# Patient Record
Sex: MALE | Race: WHITE | NOT HISPANIC OR LATINO | Employment: OTHER | URBAN - NONMETROPOLITAN AREA
[De-identification: names, ages, dates, MRNs, and addresses within clinical notes are randomized per-mention and may not be internally consistent; named-entity substitution may affect disease eponyms.]

---

## 2017-05-08 ENCOUNTER — OFFICE VISIT (OUTPATIENT)
Dept: OTOLARYNGOLOGY | Facility: CLINIC | Age: 77
End: 2017-05-08

## 2017-05-08 VITALS
WEIGHT: 198 LBS | DIASTOLIC BLOOD PRESSURE: 90 MMHG | SYSTOLIC BLOOD PRESSURE: 160 MMHG | HEIGHT: 72 IN | RESPIRATION RATE: 18 BRPM | TEMPERATURE: 97.8 F | BODY MASS INDEX: 26.82 KG/M2 | HEART RATE: 79 BPM

## 2017-05-08 DIAGNOSIS — H61.23 BILATERAL IMPACTED CERUMEN: Primary | ICD-10-CM

## 2017-05-08 PROCEDURE — 69210 REMOVE IMPACTED EAR WAX UNI: CPT | Performed by: PHYSICIAN ASSISTANT

## 2017-05-08 RX ORDER — BENAZEPRIL HYDROCHLORIDE 10 MG/1
10 TABLET ORAL DAILY
COMMUNITY
End: 2018-07-27 | Stop reason: HOSPADM

## 2017-11-13 ENCOUNTER — APPOINTMENT (OUTPATIENT)
Dept: ULTRASOUND IMAGING | Age: 77
DRG: 280 | End: 2017-11-13
Payer: MEDICARE

## 2017-11-13 ENCOUNTER — APPOINTMENT (OUTPATIENT)
Dept: GENERAL RADIOLOGY | Age: 77
DRG: 280 | End: 2017-11-13
Payer: MEDICARE

## 2017-11-13 ENCOUNTER — APPOINTMENT (OUTPATIENT)
Dept: CT IMAGING | Age: 77
DRG: 280 | End: 2017-11-13
Payer: MEDICARE

## 2017-11-13 ENCOUNTER — HOSPITAL ENCOUNTER (INPATIENT)
Age: 77
LOS: 1 days | Discharge: LEFT BEFORE DISCHARGE | DRG: 280 | End: 2017-11-14
Attending: EMERGENCY MEDICINE | Admitting: HOSPITALIST
Payer: MEDICARE

## 2017-11-13 DIAGNOSIS — J90 BILATERAL PLEURAL EFFUSION: ICD-10-CM

## 2017-11-13 DIAGNOSIS — R09.02 HYPOXIA: Primary | ICD-10-CM

## 2017-11-13 LAB
ALBUMIN SERPL-MCNC: 4 G/DL (ref 3.5–5.2)
ALP BLD-CCNC: 62 U/L (ref 40–130)
ALT SERPL-CCNC: 11 U/L (ref 5–41)
ANION GAP SERPL CALCULATED.3IONS-SCNC: 18 MMOL/L (ref 7–19)
AST SERPL-CCNC: 15 U/L (ref 5–40)
BASE EXCESS ARTERIAL: -0.9 MMOL/L (ref -2–2)
BASOPHILS ABSOLUTE: 0.1 K/UL (ref 0–0.2)
BASOPHILS RELATIVE PERCENT: 0.6 % (ref 0–1)
BILIRUB SERPL-MCNC: 0.4 MG/DL (ref 0.2–1.2)
BUN BLDV-MCNC: 13 MG/DL (ref 8–23)
C-REACTIVE PROTEIN: 0.73 MG/DL (ref 0–0.5)
CALCIUM SERPL-MCNC: 8.9 MG/DL (ref 8.8–10.2)
CARBOXYHEMOGLOBIN ARTERIAL: 1.9 % (ref 0–5)
CHLORIDE BLD-SCNC: 99 MMOL/L (ref 98–111)
CO2: 22 MMOL/L (ref 22–29)
CREAT SERPL-MCNC: 0.9 MG/DL (ref 0.5–1.2)
EOSINOPHILS ABSOLUTE: 0 K/UL (ref 0–0.6)
EOSINOPHILS RELATIVE PERCENT: 0.1 % (ref 0–5)
GFR NON-AFRICAN AMERICAN: >60
GLUCOSE BLD-MCNC: 178 MG/DL (ref 70–99)
GLUCOSE BLD-MCNC: 200 MG/DL (ref 70–99)
GLUCOSE BLD-MCNC: 259 MG/DL (ref 70–99)
GLUCOSE BLD-MCNC: 289 MG/DL (ref 74–109)
HCO3 ARTERIAL: 24.3 MMOL/L (ref 22–26)
HCT VFR BLD CALC: 39.9 % (ref 42–52)
HCT VFR BLD CALC: 42.6 % (ref 42–52)
HEMOGLOBIN, ART, EXTENDED: 15 G/DL (ref 14–18)
HEMOGLOBIN: 13.6 G/DL (ref 14–18)
HEMOGLOBIN: 14.5 G/DL (ref 14–18)
INR BLD: 0.98 (ref 0.88–1.18)
LV EF: 35 %
LVEF MODALITY: NORMAL
LYMPHOCYTES ABSOLUTE: 1.1 K/UL (ref 1.1–4.5)
LYMPHOCYTES RELATIVE PERCENT: 10.4 % (ref 20–40)
MAGNESIUM: 1.6 MG/DL (ref 1.6–2.4)
MCH RBC QN AUTO: 31.8 PG (ref 27–31)
MCH RBC QN AUTO: 31.8 PG (ref 27–31)
MCHC RBC AUTO-ENTMCNC: 34 G/DL (ref 33–37)
MCHC RBC AUTO-ENTMCNC: 34.1 G/DL (ref 33–37)
MCV RBC AUTO: 93.2 FL (ref 80–94)
MCV RBC AUTO: 93.4 FL (ref 80–94)
METHEMOGLOBIN ARTERIAL: 1 %
MONOCYTES ABSOLUTE: 0.5 K/UL (ref 0–0.9)
MONOCYTES RELATIVE PERCENT: 4.5 % (ref 0–10)
NEUTROPHILS ABSOLUTE: 9.1 K/UL (ref 1.5–7.5)
NEUTROPHILS RELATIVE PERCENT: 84.1 % (ref 50–65)
O2 CONTENT ARTERIAL: 17.8 ML/DL
O2 SAT, ARTERIAL: 84.8 %
O2 THERAPY: ABNORMAL
PCO2 ARTERIAL: 41 MMHG (ref 35–45)
PDW BLD-RTO: 13.2 % (ref 11.5–14.5)
PDW BLD-RTO: 13.2 % (ref 11.5–14.5)
PERFORMED ON: ABNORMAL
PERFORMED ON: NORMAL
PH ARTERIAL: 7.38 (ref 7.35–7.45)
PLATELET # BLD: 237 K/UL (ref 130–400)
PLATELET # BLD: 260 K/UL (ref 130–400)
PMV BLD AUTO: 11 FL (ref 9.4–12.4)
PMV BLD AUTO: 11.1 FL (ref 9.4–12.4)
PO2 ARTERIAL: 45 MMHG (ref 80–100)
POC TROPONIN I: 0.01 NG/ML (ref 0–0.08)
POTASSIUM SERPL-SCNC: 3.6 MMOL/L (ref 3.5–5)
POTASSIUM, WHOLE BLOOD: 3.1
PRO-BNP: 1307 PG/ML (ref 0–1800)
PROTHROMBIN TIME: 12.9 SEC (ref 12–14.6)
RBC # BLD: 4.28 M/UL (ref 4.7–6.1)
RBC # BLD: 4.56 M/UL (ref 4.7–6.1)
SEDIMENTATION RATE, ERYTHROCYTE: 30 MM/HR (ref 0–15)
SODIUM BLD-SCNC: 139 MMOL/L (ref 136–145)
TOTAL PROTEIN: 7 G/DL (ref 6.6–8.7)
TOTAL PROTEIN: 7.1 G/DL (ref 6.6–8.7)
TROPONIN: 0.08 NG/ML (ref 0–0.03)
TROPONIN: 0.09 NG/ML (ref 0–0.03)
TROPONIN: 0.11 NG/ML (ref 0–0.03)
TSH SERPL DL<=0.05 MIU/L-ACNC: 0.95 UIU/ML (ref 0.27–4.2)
WBC # BLD: 10.5 K/UL (ref 4.8–10.8)
WBC # BLD: 10.8 K/UL (ref 4.8–10.8)

## 2017-11-13 PROCEDURE — 6360000004 HC RX CONTRAST MEDICATION: Performed by: EMERGENCY MEDICINE

## 2017-11-13 PROCEDURE — 6370000000 HC RX 637 (ALT 250 FOR IP): Performed by: EMERGENCY MEDICINE

## 2017-11-13 PROCEDURE — 6360000002 HC RX W HCPCS: Performed by: HOSPITALIST

## 2017-11-13 PROCEDURE — 32555 ASPIRATE PLEURA W/ IMAGING: CPT

## 2017-11-13 PROCEDURE — 2580000003 HC RX 258: Performed by: HOSPITALIST

## 2017-11-13 PROCEDURE — 6370000000 HC RX 637 (ALT 250 FOR IP): Performed by: INTERNAL MEDICINE

## 2017-11-13 PROCEDURE — 6370000000 HC RX 637 (ALT 250 FOR IP): Performed by: HOSPITALIST

## 2017-11-13 PROCEDURE — 99284 EMERGENCY DEPT VISIT MOD MDM: CPT | Performed by: EMERGENCY MEDICINE

## 2017-11-13 PROCEDURE — 83735 ASSAY OF MAGNESIUM: CPT

## 2017-11-13 PROCEDURE — 71275 CT ANGIOGRAPHY CHEST: CPT

## 2017-11-13 PROCEDURE — 85610 PROTHROMBIN TIME: CPT

## 2017-11-13 PROCEDURE — 2580000003 HC RX 258: Performed by: EMERGENCY MEDICINE

## 2017-11-13 PROCEDURE — 84443 ASSAY THYROID STIM HORMONE: CPT

## 2017-11-13 PROCEDURE — 84132 ASSAY OF SERUM POTASSIUM: CPT

## 2017-11-13 PROCEDURE — 80053 COMPREHEN METABOLIC PANEL: CPT

## 2017-11-13 PROCEDURE — 99222 1ST HOSP IP/OBS MODERATE 55: CPT | Performed by: HOSPITALIST

## 2017-11-13 PROCEDURE — 87040 BLOOD CULTURE FOR BACTERIA: CPT

## 2017-11-13 PROCEDURE — 6360000002 HC RX W HCPCS: Performed by: INTERNAL MEDICINE

## 2017-11-13 PROCEDURE — 99285 EMERGENCY DEPT VISIT HI MDM: CPT

## 2017-11-13 PROCEDURE — 85025 COMPLETE CBC W/AUTO DIFF WBC: CPT

## 2017-11-13 PROCEDURE — 84155 ASSAY OF PROTEIN SERUM: CPT

## 2017-11-13 PROCEDURE — 2700000000 HC OXYGEN THERAPY PER DAY

## 2017-11-13 PROCEDURE — 2140000000 HC CCU INTERMEDIATE R&B

## 2017-11-13 PROCEDURE — 94640 AIRWAY INHALATION TREATMENT: CPT

## 2017-11-13 PROCEDURE — 93005 ELECTROCARDIOGRAM TRACING: CPT

## 2017-11-13 PROCEDURE — 71035 XR CHEST DECUBITUS LEFT: CPT

## 2017-11-13 PROCEDURE — 84484 ASSAY OF TROPONIN QUANT: CPT

## 2017-11-13 PROCEDURE — 82948 REAGENT STRIP/BLOOD GLUCOSE: CPT

## 2017-11-13 PROCEDURE — 71035 XR CHEST DECUBITUS RIGHT: CPT

## 2017-11-13 PROCEDURE — 93306 TTE W/DOPPLER COMPLETE: CPT

## 2017-11-13 PROCEDURE — 2580000003 HC RX 258: Performed by: INTERNAL MEDICINE

## 2017-11-13 PROCEDURE — 87070 CULTURE OTHR SPECIMN AEROBIC: CPT

## 2017-11-13 PROCEDURE — 87205 SMEAR GRAM STAIN: CPT

## 2017-11-13 PROCEDURE — 85027 COMPLETE CBC AUTOMATED: CPT

## 2017-11-13 PROCEDURE — 83880 ASSAY OF NATRIURETIC PEPTIDE: CPT

## 2017-11-13 PROCEDURE — 85652 RBC SED RATE AUTOMATED: CPT

## 2017-11-13 PROCEDURE — 86140 C-REACTIVE PROTEIN: CPT

## 2017-11-13 PROCEDURE — 36600 WITHDRAWAL OF ARTERIAL BLOOD: CPT

## 2017-11-13 PROCEDURE — 94664 DEMO&/EVAL PT USE INHALER: CPT

## 2017-11-13 PROCEDURE — 82803 BLOOD GASES ANY COMBINATION: CPT

## 2017-11-13 PROCEDURE — 99223 1ST HOSP IP/OBS HIGH 75: CPT | Performed by: INTERNAL MEDICINE

## 2017-11-13 PROCEDURE — 71010 XR CHEST PORTABLE: CPT

## 2017-11-13 PROCEDURE — 36415 COLL VENOUS BLD VENIPUNCTURE: CPT

## 2017-11-13 PROCEDURE — 6360000002 HC RX W HCPCS: Performed by: EMERGENCY MEDICINE

## 2017-11-13 RX ORDER — GLYBURIDE 5 MG/1
5 TABLET ORAL
COMMUNITY

## 2017-11-13 RX ORDER — ONDANSETRON 2 MG/ML
4 INJECTION INTRAMUSCULAR; INTRAVENOUS EVERY 6 HOURS PRN
Status: DISCONTINUED | OUTPATIENT
Start: 2017-11-13 | End: 2017-11-14 | Stop reason: HOSPADM

## 2017-11-13 RX ORDER — DEXTROSE MONOHYDRATE 25 G/50ML
12.5 INJECTION, SOLUTION INTRAVENOUS PRN
Status: DISCONTINUED | OUTPATIENT
Start: 2017-11-13 | End: 2017-11-14 | Stop reason: HOSPADM

## 2017-11-13 RX ORDER — FUROSEMIDE 10 MG/ML
40 INJECTION INTRAMUSCULAR; INTRAVENOUS DAILY
Status: DISCONTINUED | OUTPATIENT
Start: 2017-11-13 | End: 2017-11-14 | Stop reason: HOSPADM

## 2017-11-13 RX ORDER — GUAIFENESIN 600 MG/1
600 TABLET, EXTENDED RELEASE ORAL 2 TIMES DAILY
Status: DISCONTINUED | OUTPATIENT
Start: 2017-11-13 | End: 2017-11-14 | Stop reason: HOSPADM

## 2017-11-13 RX ORDER — SODIUM CHLORIDE 0.9 % (FLUSH) 0.9 %
10 SYRINGE (ML) INJECTION EVERY 12 HOURS SCHEDULED
Status: DISCONTINUED | OUTPATIENT
Start: 2017-11-13 | End: 2017-11-14 | Stop reason: HOSPADM

## 2017-11-13 RX ORDER — DEXTROSE MONOHYDRATE 50 MG/ML
100 INJECTION, SOLUTION INTRAVENOUS PRN
Status: DISCONTINUED | OUTPATIENT
Start: 2017-11-13 | End: 2017-11-14 | Stop reason: HOSPADM

## 2017-11-13 RX ORDER — NICOTINE POLACRILEX 4 MG
15 LOZENGE BUCCAL PRN
Status: DISCONTINUED | OUTPATIENT
Start: 2017-11-13 | End: 2017-11-14 | Stop reason: HOSPADM

## 2017-11-13 RX ORDER — SODIUM CHLORIDE 0.9 % (FLUSH) 0.9 %
10 SYRINGE (ML) INJECTION EVERY 12 HOURS SCHEDULED
Status: DISCONTINUED | OUTPATIENT
Start: 2017-11-13 | End: 2017-11-13 | Stop reason: SDUPTHER

## 2017-11-13 RX ORDER — SODIUM CHLORIDE 0.9 % (FLUSH) 0.9 %
10 SYRINGE (ML) INJECTION PRN
Status: DISCONTINUED | OUTPATIENT
Start: 2017-11-13 | End: 2017-11-13 | Stop reason: SDUPTHER

## 2017-11-13 RX ORDER — ACETAMINOPHEN 325 MG/1
650 TABLET ORAL EVERY 4 HOURS PRN
Status: DISCONTINUED | OUTPATIENT
Start: 2017-11-13 | End: 2017-11-14 | Stop reason: HOSPADM

## 2017-11-13 RX ORDER — DOXYCYCLINE HYCLATE 100 MG/1
100 CAPSULE ORAL EVERY 12 HOURS SCHEDULED
Status: DISCONTINUED | OUTPATIENT
Start: 2017-11-13 | End: 2017-11-14 | Stop reason: HOSPADM

## 2017-11-13 RX ORDER — SODIUM CHLORIDE 0.9 % (FLUSH) 0.9 %
10 SYRINGE (ML) INJECTION PRN
Status: DISCONTINUED | OUTPATIENT
Start: 2017-11-13 | End: 2017-11-14 | Stop reason: HOSPADM

## 2017-11-13 RX ORDER — IPRATROPIUM BROMIDE AND ALBUTEROL SULFATE 2.5; .5 MG/3ML; MG/3ML
1 SOLUTION RESPIRATORY (INHALATION) EVERY 4 HOURS
Status: DISCONTINUED | OUTPATIENT
Start: 2017-11-13 | End: 2017-11-14 | Stop reason: HOSPADM

## 2017-11-13 RX ORDER — ASPIRIN 81 MG/1
81 TABLET, CHEWABLE ORAL DAILY
Status: DISCONTINUED | OUTPATIENT
Start: 2017-11-14 | End: 2017-11-14 | Stop reason: HOSPADM

## 2017-11-13 RX ORDER — IPRATROPIUM BROMIDE AND ALBUTEROL SULFATE 2.5; .5 MG/3ML; MG/3ML
1 SOLUTION RESPIRATORY (INHALATION) ONCE
Status: COMPLETED | OUTPATIENT
Start: 2017-11-13 | End: 2017-11-13

## 2017-11-13 RX ORDER — GLYBURIDE 5 MG/1
5 TABLET ORAL
Status: DISCONTINUED | OUTPATIENT
Start: 2017-11-14 | End: 2017-11-14 | Stop reason: HOSPADM

## 2017-11-13 RX ADMIN — AZITHROMYCIN MONOHYDRATE 500 MG: 500 INJECTION, POWDER, LYOPHILIZED, FOR SOLUTION INTRAVENOUS at 09:15

## 2017-11-13 RX ADMIN — ENOXAPARIN SODIUM 90.7 MG: 120 INJECTION SUBCUTANEOUS at 21:09

## 2017-11-13 RX ADMIN — Medication 10 ML: at 12:27

## 2017-11-13 RX ADMIN — IPRATROPIUM BROMIDE AND ALBUTEROL SULFATE 1 AMPULE: .5; 3 SOLUTION RESPIRATORY (INHALATION) at 15:03

## 2017-11-13 RX ADMIN — METOPROLOL TARTRATE 25 MG: 25 TABLET, FILM COATED ORAL at 21:16

## 2017-11-13 RX ADMIN — Medication 10 ML: at 21:15

## 2017-11-13 RX ADMIN — INSULIN LISPRO 2 UNITS: 100 INJECTION, SOLUTION INTRAVENOUS; SUBCUTANEOUS at 21:18

## 2017-11-13 RX ADMIN — IPRATROPIUM BROMIDE AND ALBUTEROL SULFATE 1 AMPULE: .5; 3 SOLUTION RESPIRATORY (INHALATION) at 23:03

## 2017-11-13 RX ADMIN — GUAIFENESIN 600 MG: 600 TABLET, EXTENDED RELEASE ORAL at 21:12

## 2017-11-13 RX ADMIN — DOXYCYCLINE HYCLATE 100 MG: 100 CAPSULE, GELATIN COATED ORAL at 21:10

## 2017-11-13 RX ADMIN — FUROSEMIDE 40 MG: 10 INJECTION, SOLUTION INTRAMUSCULAR; INTRAVENOUS at 12:26

## 2017-11-13 RX ADMIN — IPRATROPIUM BROMIDE AND ALBUTEROL SULFATE 1 AMPULE: .5; 3 SOLUTION RESPIRATORY (INHALATION) at 19:13

## 2017-11-13 RX ADMIN — IPRATROPIUM BROMIDE AND ALBUTEROL SULFATE 1 AMPULE: .5; 3 SOLUTION RESPIRATORY (INHALATION) at 11:41

## 2017-11-13 RX ADMIN — IPRATROPIUM BROMIDE AND ALBUTEROL SULFATE 1 AMPULE: .5; 2.5 SOLUTION RESPIRATORY (INHALATION) at 05:50

## 2017-11-13 RX ADMIN — GUAIFENESIN 600 MG: 600 TABLET, EXTENDED RELEASE ORAL at 12:35

## 2017-11-13 RX ADMIN — IOPAMIDOL 90 ML: 755 INJECTION, SOLUTION INTRAVENOUS at 06:42

## 2017-11-13 RX ADMIN — CEFTRIAXONE 1 G: 1 INJECTION, SOLUTION INTRAVENOUS at 12:25

## 2017-11-13 ASSESSMENT — ENCOUNTER SYMPTOMS
EYE PAIN: 0
DIARRHEA: 0
COUGH: 1
ABDOMINAL PAIN: 0
SHORTNESS OF BREATH: 1
VOMITING: 0

## 2017-11-13 NOTE — ED NOTES
Patient placed on cardiac monitor, continuous pulse oximeter, and NIBP monitor. Monitor alarms on.   [id number] [facility]  11/13/17 1202

## 2017-11-13 NOTE — CONSULTS
[facility]    Cardiology Consultation      Date of Admission:  11/13/2017  5:34 AM    Date of Initially Being Seen / Consultation:  11/13/17    Cardiologist:  Dr. Rut Cannon     Cardiology Attending: Dr. Nita Cochran Attending: Hospitalist     PCP:  BHAVYA Hoyt    Reason for Consultation or Admission / Chief Complaint:  Dyspnea, CAD, Pleural effusion, elevated troponin    SUBJECTIVE AND HISTORY OF PRESENT ILLNESS:    Source of the history:  Patient, family, previous inpatient and outpatient records in [facility]. Ly Varma is a 69 y.o. male who presents to [facility] Emergency room with symptoms / signs / problem or diagnosis of shortness of breath and cough. Patient states that for the past two weeks he has had a productive cough with yellow sputum. Patient was sleeping this morning when he suddenly woke from bed short of breath. He turned on his side and the dyspnea worsened. He decided to call EMS to be evaluated. Patient denied fevers/chills and history of DVT/PE. Patient denied chest pain, pressure, and tightness. Patient denied prior history of heart disease; however states that it does run in his family. Upon EMS arriving patient's O2 sat was 84%. He states he has had lung problems in the past requiring his lungs to be \"scraped out. \" This occurred in the 70's. Patient denied orthopnea, edema, nausea, vomiting, diaphoresis, presyncope, and syncope. Family present:  no      CARDIAC RISK PROFILE:    Risk Factor Yes / No / Unknown       Gender Male   Cigarette Use Yes: Former   Family History of Cardiovascular Disease Yes: Father and brother   Diabetes Mellitus no   Hypercholesteremia no   Hypertension no          Cardiac Specific Problems:    Specialty Problems     None            PRIOR CARDIAC PROBLEM LIST  (IF APPLICABLE):     The patient did not tell her nurse practitioner this but later I found out that he indeed has a history of Relation Age of Onset    COPD Mother     Heart Attack Father     Heart Disease Father     Heart Attack Brother     Heart Disease Brother          REVIEW OF SYSTEMS:     Except as noted in the HPI, all other systems are negative        PHYSICAL EXAMINATION:     /75   Pulse 85   Temp 97 °F (36.1 °C) (Temporal)   Resp 16   Ht 5' 11\" (1.803 m)   Wt 200 lb (90.7 kg)   SpO2 98%   BMI 27.89 kg/m²     GENERAL - well developed and well nourished, in no amount of generalized distress  HEENT   PERRLA, Hearing appears normal, conjunctiva and lids are normal, ears and nose appear normal  NECK - no thyromegaly, no JVD, trachea is in the midline  CARDIOVASCULAR  PMI is in the left mid line clavicular position, Normal S1 and S2 with a grade 1/6 systolic murmur. No S3 or S4    PULMONARY  No respiratory distress. No wheezes and rales.   Breath sounds in both  lung fields are Decreased in bases  ABDOMEN   soft, non tender, no rebound, no hepatomegaly or splenomegaly  MUSCULOSKELETAL   Sitting, digitals and nails are without clubbing or cyanosis  EXTREMITIES - No edema  NEUROLOGIC - cranial nerves, II-XII, are normal  SKIN - turgor is normal, no rash  PSYCHIATRIC - normal mood and affect, alert and orientated x 3, judgement and insight appear appropriate      LABORATORY EVALUATION & TESTING:    I have personally reviewed and interpreted the results of the following diagnostic testing      EKG and or Telemetry:  which was personally reviewed me:  Sinus and with Right BBB and Left Anterior Fascicular Block rhythm, 99 bpm    Troponin:  positive for myocardial necrosis ( 0.11); the creatinine is normal    CBC:   Recent Labs      11/13/17   0556  11/13/17   1153   WBC  10.5  10.8   HGB  14.5  13.6*   HCT  42.6  39.9*   MCV  93.4  93.2   PLT  260  237     BMP:   Recent Labs      11/13/17   0556  11/13/17   0603   NA  139   --    K  3.6  3.1   CL  99   --    CO2  22   --    BUN  13   --    CREATININE  0.9   -- Cardiac Enzymes:   Recent Labs      11/13/17   0559  11/13/17   1153   TROPONINI  0.01  0.11*     PT/INR:   Recent Labs      11/13/17   1131   PROTIME  12.9   INR  0.98     APTT: No results for input(s): APTT in the last 72 hours. Liver Profile:  Lab Results   Component Value Date    AST 15 11/13/2017    ALT 11 11/13/2017    BILITOT 0.4 11/13/2017    ALKPHOS 62 11/13/2017   No results found for: CHOL, HDL, TRIG  TSH:  Lab Results   Component Value Date    TSH 0.950 11/13/2017     UA: No results found for: NITRITE, COLORU, PHUR, LABCAST, WBCUA, RBCUA, MUCUS, TRICHOMONAS, YEAST, BACTERIA, CLARITYU, SPECGRAV, LEUKOCYTESUR, UROBILINOGEN, BILIRUBINUR, BLOODU, GLUCOSEU, AMORPHOUS          ALL THE CARDIOLOGY PROBLEMS ARE LISTED ABOVE; HOWEVER, THE FOLLOWING SPECIFIC CARDIAC PROBLEMS WERE ADDRESSED AND TREATED DURING [facility] VISIT TODAY:                                                                                                                                                                                                                                            MEDICAL DECISION MAKING             Cardiac Specific Problem / Diagnosis  Discussion and Data Reviewed Diagnostic Procedures Ordered Management Options Selected           1. Presenting problem / symptom    Shortness of breath  Initial encounter   Troponin 0.11, Hgb 13.6, Cr 0.9, BNP 1307    PO2 on ABG 45    CTA: No evidence of a normal embolus. Bilateral pleural effusions, small, right greater than left. Yes: TTE, Serial troponin Continue current medications: Yes             2. Elevated troponin Initial presentation during this evaluation   Troponin 0.01, 0.11    Denies chest pain, pressure, and tightness Yes: Serial troponin, TTE Serial troponin, telemetry, repeat EKG           3. Diabetes Mellitus Initial presentation during this evaluation CTA: No evidence of a normal embolus. Bilateral pleural effusions, small, right greater than left.  No

## 2017-11-13 NOTE — ED NOTES
Report received at bedside from Tong Seymour RN. Pt's 02 was in the 80s upon EMS arrival. EMS gave duoneb and lasix en route and started o2 at 2L. Pt's sats came up to 96%. Upon arrival to the hospital pt's sat dropped to 82% on 2L and o2 was increased to 4L by RN and RT was called. Pt then placed on Venti mask 50% on 15L. Pt's O2 then came up to 94%.      Nila Pratt RN  11/13/17 6692

## 2017-11-13 NOTE — ED PROVIDER NOTES
[facility] EMERGENCY DEPT  eMERGENCY dEPARTMENT eNCOUnter      Pt Name: Keaton Plascencia  MRN: [id number]  [place] 1940  Date of evaluation: 11/13/2017  Provider: Antwon Carrillo MD    CHIEF COMPLAINT       Chief Complaint   Patient presents with    Respiratory Distress     EMS states O2 sat was 84% on their arrival         HISTORY OF PRESENT ILLNESS   (Location/Symptom, Timing/Onset, Context/Setting, Quality, Duration, Modifying Factors, Severity)  Note limiting factors. Keaton Plascencia is a 69 y.o. male who presents to the emergency department Due to shortness of breath and cough. Patient's said he's had a rattly cough for the past 2 weeks. Tonight became much more short of breath. His dyspnea awoke him from sleep. Denies chest pain. No unilateral leg swelling or pain. No history of DVT or PE. No fevers. Has been coughing up yellow sputum. Has not been evaluated for his cough over the past 2 weeks but says his symptoms are much worse this morning and they have been for the past 2 weeks. Denies any history of any pulmonary disease. [facility]    Nursing Notes were reviewed. REVIEW OF SYSTEMS    (2-9 systems for level 4, 10 or more for level 5)     Review of Systems   Constitutional: Negative for fever. Eyes: Negative for pain. Respiratory: Positive for cough and shortness of breath. Cardiovascular: Negative for chest pain and palpitations. Gastrointestinal: Negative for abdominal pain, diarrhea and vomiting. Genitourinary: Negative for dysuria. Skin: Negative for rash. Neurological: Negative for weakness and headaches. All other systems reviewed and are negative. A complete review of systems was performed and is negative except as noted above in the HPI.        PAST MEDICAL HISTORY     Past Medical History:   Diagnosis Date    CAD (coronary artery disease)     Diabetes mellitus ([facility] [address]          SURGICAL HISTORY       Past Surgical History:   Procedure Laterality Date    CARDIAC SURGERY CURRENT MEDICATIONS       Previous Medications    GLYBURIDE (DIABETA) 5 MG TABLET    Take 5 mg by mouth daily (with breakfast)       ALLERGIES     Demerol hcl [meperidine]    FAMILY HISTORY     No family history on file. SOCIAL HISTORY       Social History     Social History    Marital status:      Spouse name: N/A    Number of children: N/A    Years of education: N/A     Social History Main Topics    Smoking status: Former Smoker     Types: Cigarettes     Quit date: 1999    Smokeless tobacco: Never Used      Comment: pt occ chews tobacco    Alcohol use No    Drug use: No    Sexual activity: Not on file     Other Topics Concern    Not on file     Social History Narrative    No narrative on file       SCREENINGS             PHYSICAL EXAM    (up to 7 for level 4, 8 or more for level 5)     ED Triage Vitals [11/13/17 0535]   BP Temp Temp Source Pulse Resp SpO2 Height Weight   -- 97.6 °F (36.4 °C) Temporal 110 26 (!) 85 % 5' 11\" (1.803 m) 200 lb (90.7 kg)       Physical Exam   Constitutional: He is oriented to person, place, and time. He appears well-developed. No distress. HENT:   Head: Normocephalic and atraumatic. Eyes: Pupils are equal, round, and reactive to light. No scleral icterus. Neck: Normal range of motion. Neck supple. No JVD present. Cardiovascular: Regular rhythm, normal heart sounds and intact distal pulses. Tachycardia present. Pulmonary/Chest: Tachypnea noted. He is in respiratory distress. He has rhonchi. Abdominal: Soft. He exhibits no distension. There is no tenderness. Musculoskeletal: He exhibits no edema or tenderness. Neurological: He is alert and oriented to person, place, and time. Skin: Skin is warm and dry. Psychiatric: He has a normal mood and affect. His behavior is normal.   Vitals reviewed.       DIAGNOSTIC RESULTS     EKG: All EKG's are interpreted by the Emergency Department Physician who either signs or Co-signs this chart in the absence of a cardiologist.    Normal sinus rhythm. Right bundle-branch block. . Left anterior fascicular block. No prior EKG available for comparison. RADIOLOGY:   Non-plain film images such as CT, Ultrasound and MRI are read by the radiologist. Plain radiographic images are visualized and preliminarily interpreted by the emergency physician with the below findings:    Interpretation per the Radiologist below, if available at the time of this note:    CTA PULMONARY W CONTRAST   Final Result   1. No evidence of a normal embolus. 2.  Bilateral pleural effusions, small, right greater than left. .   Signed by Dr Janet Tinoco on 11/13/2017 8:21 AM      XR Chest Portable   Final Result   Impression:   Asymmetrically dense right lung, may reflect atelectasis versus   infection. No focal consolidation to suggest lobar pneumonia. No   comparison available. Signed by Dr Janet Tinoco on 11/13/2017 7:29 AM          LABS:  Labs Reviewed   CBC - Abnormal; Notable for the following:        Result Value    RBC 4.56 (*)     MCH 31.8 (*)     All other components within normal limits   COMPREHENSIVE METABOLIC PANEL - Abnormal; Notable for the following:     Glucose 289 (*)     All other components within normal limits   BLOOD GAS, ARTERIAL - Abnormal; Notable for the following:     pO2, Arterial 45.0 (*)     O2 Sat, Arterial 84.8 (*)     All other components within normal limits    Narrative:     CALL  [place]  [facility] tel. ,  dr Marleni Brownlee, 11/13/2017 06:05, by Jonathan [email or web address] #1   CULTURE BLOOD #2   POTASSIUM, WHOLE BLOOD   BRAIN NATRIURETIC PEPTIDE   POCT TROPONIN   POCT VENOUS       All other labs were within normal range or not returned as of this dictation.     EMERGENCY DEPARTMENT COURSE and DIFFERENTIAL DIAGNOSIS/MDM:   Vitals:    Vitals:    11/13/17 0641 11/13/17 0700 11/13/17 0746 11/13/17 0802   BP: (!) 144/83  (!) 171/94 (!) 169/95   Pulse: 90 99 97 92   Resp: 18  22 20   Temp:       TempSrc:       SpO2: 94% 97% 97% 96%   Weight:       Height:           MDM  Patient resting comfortably and in no distress. Oxygen improved on supplemental O2. Patient resting comfortably at this time and said his symptoms have resolved with the supplemental oxygen. Bilateral pleural effusion seen on CT scan. Case discussed with hospitalist, Dr. Tawana Santiago, who is agreeable with plan of care and will admit. CONSULTS:  None    PROCEDURES:  Unless otherwise noted below, none     Procedures    FINAL IMPRESSION      1. Hypoxia    2.  Bilateral pleural effusion          DISPOSITION/PLAN   DISPOSITION     PATIENT REFERRED TO:  Jaqueline Gonsales, APRN  [address] [place] [id number] [phone]            DISCHARGE MEDICATIONS:  New Prescriptions    No medications on file          (Please note that portions of this note were completed with a voice recognition program.  Efforts were made to edit the dictations but occasionally words are mis-transcribed.)    Janeth Patricio MD (electronically signed)  Attending Emergency Physician        Janeth Patricio MD  11/13/17 5971

## 2017-11-13 NOTE — ED NOTES
ASSESSMENT:    PT ALERT/ORIENTED X4. PUPILS EQUAL/REACTIVE    SKIN:  WARM/DRY PINK CAPILLARY REFILL < 2SECS    CARDIAC:  S1 S2 NOTED     LUNGS: DIMINISHED SOUNDS UPPER AND LOWER LOBES, RESPIRATIONS EVEN/UNLABORED     ABDOMEN: BOWEL SOUNDS NOTED UPPER AND LOWER QUADRANTS                     SOFT AND NONTENDER. EXTREMITIES:  BILATERAL DP AND PT AND NO EDEMA NOTED. NO DISTRESS NOTED. SIDE RAILS UP AND CALL LIGHT IN REACH.      Janis Vicente RN  11/13/17 7544

## 2017-11-13 NOTE — PROGRESS NOTES
Recent labs with Most recent results first          Procedure Component Value Units Date/Time     Blood Gas, Arterial [[id number]] (Abnormal) Collected: 11/13/17 0603     Specimen: Blood gases Updated: 11/13/17 0605      pH, Arterial 7.380      pCO2, Arterial 41.0 mmHg       pO2, Arterial 45.0 (LL) mmHg       HCO3, Arterial 24.3 mmol/L       Base Excess, Arterial -0.9 mmol/L       Hemoglobin, Art, Extended 15.0 g/dL       O2 Sat, Arterial 84.8 (LL) %       Carboxyhgb, Arterial 1.9 %       Methemoglobin, Arterial 1.0 %       O2 Content, Arterial 17.8 mL/dL       O2 Therapy Unknown     Narrative:       CALL  [facility] tel. ,  dr Minna Green, 11/13/2017 06:05, by ANNITA     Potassium, Whole Blood [[id number]] Collected: 11/13/17 0603      Updated: 11/13/17 3237      Potassium, Whole Blood 3.1     Pt on 4 lpm nc, RR, AT+

## 2017-11-13 NOTE — H&P
History and Physical    Patient Name:  Anthony De Leon    :  1940    Chief Complaint:   dyspnea    History of Present Illness:   Anthony De Leon presents to [facility] with 2 week history of increasing shortness of breath and cough. Cough is productive yellow - green. 3 days ago patient had chills. His dyspnea awoke him from sleep. He denies chest pain, palpitations, peripheral swelling. No unilateral leg swelling or pain. No history of DVT or PE. Denies any history of any pulmonary disease. Does have CAD with stent placed several years ago. No history of CHF. Exposure to chickens and dears     Past Medical History:   has a past medical history of CAD (coronary artery disease); Diabetes mellitus ([facility] [address] and Myocardial infarct. Surgical History:   has a past surgical history that includes Cardiac surgery. Social History:   reports that he quit smoking about 18 years ago. His smoking use included Cigarettes. He has never used smokeless tobacco. He reports that he does not drink alcohol or use drugs. Family History: Mother with DM    Medications:  Prior to Admission medications    Medication Sig Start Date End Date Taking? Authorizing Provider   glyBURIDE (DIABETA) 5 MG tablet Take 5 mg by mouth daily (with breakfast)   Yes Historical Provider, MD       Allergies:  Demerol hcl [meperidine]     Review of Systems:   · Constitutional: there has been no unanticipated weight loss. There's been no change in energy level, sleep pattern, or activity level. · Eyes: No visual changes or diplopia. No scleral icterus. · ENT: No Headaches, hearing loss or vertigo. No mouth sores or sore throat. · Cardiovascular: No chest pain, palpitations or loss of consciousness. · Respiratory: Cough, no wheezing, sputum production. No hemoptysis. · Gastrointestinal: No abdominal pain, appetite loss, blood in stools. No change in bowel or bladder habits.   · Genitourinary: No dysuria, trouble voiding, or hematuria. · Musculoskeletal:  No gait disturbance, weakness or joint complaints. · Integumentary: No rash or pruritis. · Neurological: No headache, diplopia, change in muscle strength, numbness or tingling. No change in gait, balance, coordination, mood, affect, memory, mentation, behavior. · Psychiatric: history of anxiety  · Endocrine: No temperature intolerance. No excessive thirst, fluid intake, or urination. No tremor. · Hematologic/Lymphatic: No abnormal bruising or bleeding, blood clots or swollen lymph nodes. · Allergic/Immunologic: No nasal congestion or hives. Physical Examination:    Vital Signs: /81   Pulse 88   Temp 97.6 °F (36.4 °C) (Temporal)   Resp 16   Ht 5' 11\" (1.803 m)   Wt 200 lb (90.7 kg)   SpO2 100%   BMI 27.89 kg/m²   General appearance: Well preserved, mesomorphic body habitus, alert, no distress, on venturi mask  Skin: Skin color, texture, turgor normal. No rashes or lesions. No induration or tightening palpated. Head: Normocephalic. No masses, lesions, tenderness or abnormalities  Eyes: conjunctivae/corneas clear. EOM's intact. Sclera non icteric. Ears: External ears normal.  Hearing normal to finger rub. Nose/Sinuses: Nares normal. Septum midline. Mucosa normal.   Oropharynx: Lips, mucosa, and tongue normal.  Neck: Neck supple, and symmetric. No adenopathy. Trachea is midline. Carotids brisk in upstroke without bruits, No abnormal JVP noted at 45°. Lungs: Lungs clear to auscultation bilaterally. No retractions or use of accessory muscles. No vocal fremitus. No ronchi, crackle or rale. Heart:  S1 > S2. Regular rate and rhythm. No gallop, murmur, rub, palpable thrill or heave noted. Abdomen: Abdomen soft, non-tender. BS normal. No masses, organomegaly. No hernia noted. Extremities: Extremities normal. No deformities, edema, or skin discoloration. No cyanosis or clubbing noted to the nails. Peripheral pulses 4/4.   Musculoskeletal: Spine ROM normal. Muscular strength intact. Neuro: Cranial nerves intact. Motor: Strength 5/5 in all extremities. No focal weakness. Sensory: grossly normal to touch. Gait normal. Coordination intact. Pertinent Labs:  CBC:   Recent Labs      11/13/17   0556   WBC  10.5   RBC  4.56*   HGB  14.5   HCT  42.6   MCV  93.4   MCH  31.8*   MCHC  34.0   RDW  13.2   PLT  260   MPV  11.1     BMP:   Recent Labs      11/13/17   0556  11/13/17   0603   NA  139   --    K  3.6  3.1   CL  99   --    CO2  22   --    BUN  13   --    CREATININE  0.9   --    GLUCOSE  289*   --    CALCIUM  8.9   --      Cardiac Injury Profile:   Lab Results   Component Value Date    TROPONINI 0.01 11/13/2017         Assessment/Plan:    1. BL PNA with BL plural effusion. Patient is refusing thoracentesis for now. Decubitus BL chest xrays pending. Blood and sputum cultures. Start doxycline and rocephin, mucinex, IS, duonebs, lasix. 2. Hypoxic acute resp failure, lasix, oxygen, duonebs, echo. Check mag and tsh  3. DM2. ISS  4. CAD, troponin, tele        I have reviewed my findings and recommendations in detail with Dena Moore.     Kayode Ferris

## 2017-11-13 NOTE — ED NOTES
Dr Yesenia Gutiérrez at bedside     Casey Overall, [address] [facility]  11/13/17 4587

## 2017-11-14 VITALS
OXYGEN SATURATION: 94 % | WEIGHT: 189.4 LBS | DIASTOLIC BLOOD PRESSURE: 77 MMHG | SYSTOLIC BLOOD PRESSURE: 147 MMHG | RESPIRATION RATE: 18 BRPM | TEMPERATURE: 97.8 F | HEART RATE: 87 BPM | HEIGHT: 71 IN | BODY MASS INDEX: 26.52 KG/M2

## 2017-11-14 PROBLEM — J18.9 PNEUMONIA OF BOTH LUNGS DUE TO INFECTIOUS ORGANISM: Status: ACTIVE | Noted: 2017-11-14

## 2017-11-14 LAB
ALBUMIN SERPL-MCNC: 3.7 G/DL (ref 3.5–5.2)
ALP BLD-CCNC: 44 U/L (ref 40–130)
ALT SERPL-CCNC: 10 U/L (ref 5–41)
ANION GAP SERPL CALCULATED.3IONS-SCNC: 14 MMOL/L (ref 7–19)
AST SERPL-CCNC: 16 U/L (ref 5–40)
BASOPHILS ABSOLUTE: 0.1 K/UL (ref 0–0.2)
BASOPHILS RELATIVE PERCENT: 0.8 % (ref 0–1)
BILIRUB SERPL-MCNC: 0.3 MG/DL (ref 0.2–1.2)
BUN BLDV-MCNC: 22 MG/DL (ref 8–23)
CALCIUM SERPL-MCNC: 9 MG/DL (ref 8.8–10.2)
CHLORIDE BLD-SCNC: 97 MMOL/L (ref 98–111)
CHOLESTEROL, TOTAL: 231 MG/DL (ref 160–199)
CO2: 28 MMOL/L (ref 22–29)
CREAT SERPL-MCNC: 1.1 MG/DL (ref 0.5–1.2)
EKG P AXIS: 50 DEGREES
EKG P-R INTERVAL: 174 MS
EKG Q-T INTERVAL: 388 MS
EKG QRS DURATION: 154 MS
EKG QTC CALCULATION (BAZETT): 454 MS
EKG T AXIS: 88 DEGREES
EOSINOPHILS ABSOLUTE: 0.1 K/UL (ref 0–0.6)
EOSINOPHILS RELATIVE PERCENT: 1.3 % (ref 0–5)
GFR NON-AFRICAN AMERICAN: >60
GLUCOSE BLD-MCNC: 158 MG/DL (ref 74–109)
GLUCOSE BLD-MCNC: 184 MG/DL (ref 70–99)
GLUCOSE BLD-MCNC: 216 MG/DL (ref 70–99)
HCT VFR BLD CALC: 37.2 % (ref 42–52)
HDLC SERPL-MCNC: 33 MG/DL (ref 55–121)
HEMOGLOBIN: 12.6 G/DL (ref 14–18)
INR BLD: 1.09 (ref 0.88–1.18)
LDL CHOLESTEROL CALCULATED: 173 MG/DL
LYMPHOCYTES ABSOLUTE: 2.1 K/UL (ref 1.1–4.5)
LYMPHOCYTES RELATIVE PERCENT: 22.4 % (ref 20–40)
MCH RBC QN AUTO: 31.8 PG (ref 27–31)
MCHC RBC AUTO-ENTMCNC: 33.9 G/DL (ref 33–37)
MCV RBC AUTO: 93.9 FL (ref 80–94)
MONOCYTES ABSOLUTE: 0.9 K/UL (ref 0–0.9)
MONOCYTES RELATIVE PERCENT: 9.7 % (ref 0–10)
NEUTROPHILS ABSOLUTE: 6.2 K/UL (ref 1.5–7.5)
NEUTROPHILS RELATIVE PERCENT: 65.6 % (ref 50–65)
PDW BLD-RTO: 13.3 % (ref 11.5–14.5)
PERFORMED ON: ABNORMAL
PERFORMED ON: ABNORMAL
PLATELET # BLD: 231 K/UL (ref 130–400)
PMV BLD AUTO: 11.8 FL (ref 9.4–12.4)
POTASSIUM SERPL-SCNC: 3.3 MMOL/L (ref 3.5–5)
PROTHROMBIN TIME: 14 SEC (ref 12–14.6)
RBC # BLD: 3.96 M/UL (ref 4.7–6.1)
SODIUM BLD-SCNC: 139 MMOL/L (ref 136–145)
TOTAL PROTEIN: 6.8 G/DL (ref 6.6–8.7)
TRIGL SERPL-MCNC: 125 MG/DL (ref 0–149)
TROPONIN: 0.07 NG/ML (ref 0–0.03)
TROPONIN: 0.07 NG/ML (ref 0–0.03)
TROPONIN: 0.08 NG/ML (ref 0–0.03)
WBC # BLD: 9.5 K/UL (ref 4.8–10.8)

## 2017-11-14 PROCEDURE — 80061 LIPID PANEL: CPT

## 2017-11-14 PROCEDURE — 94640 AIRWAY INHALATION TREATMENT: CPT

## 2017-11-14 PROCEDURE — 2700000000 HC OXYGEN THERAPY PER DAY

## 2017-11-14 PROCEDURE — 6370000000 HC RX 637 (ALT 250 FOR IP): Performed by: HOSPITALIST

## 2017-11-14 PROCEDURE — 6370000000 HC RX 637 (ALT 250 FOR IP): Performed by: INTERNAL MEDICINE

## 2017-11-14 PROCEDURE — 6360000002 HC RX W HCPCS: Performed by: INTERNAL MEDICINE

## 2017-11-14 PROCEDURE — 85610 PROTHROMBIN TIME: CPT

## 2017-11-14 PROCEDURE — 93005 ELECTROCARDIOGRAM TRACING: CPT

## 2017-11-14 PROCEDURE — 6360000002 HC RX W HCPCS: Performed by: HOSPITALIST

## 2017-11-14 PROCEDURE — 36415 COLL VENOUS BLD VENIPUNCTURE: CPT

## 2017-11-14 PROCEDURE — 99232 SBSQ HOSP IP/OBS MODERATE 35: CPT | Performed by: INTERNAL MEDICINE

## 2017-11-14 PROCEDURE — 84484 ASSAY OF TROPONIN QUANT: CPT

## 2017-11-14 PROCEDURE — 80053 COMPREHEN METABOLIC PANEL: CPT

## 2017-11-14 PROCEDURE — 82948 REAGENT STRIP/BLOOD GLUCOSE: CPT

## 2017-11-14 PROCEDURE — 99238 HOSP IP/OBS DSCHRG MGMT 30/<: CPT | Performed by: INTERNAL MEDICINE

## 2017-11-14 PROCEDURE — 6370000000 HC RX 637 (ALT 250 FOR IP): Performed by: NURSE PRACTITIONER

## 2017-11-14 PROCEDURE — 85025 COMPLETE CBC W/AUTO DIFF WBC: CPT

## 2017-11-14 RX ORDER — POTASSIUM CHLORIDE 20 MEQ/1
20 TABLET, EXTENDED RELEASE ORAL DAILY
Qty: 30 TABLET | Refills: 1 | Status: SHIPPED | OUTPATIENT
Start: 2017-11-14

## 2017-11-14 RX ORDER — FUROSEMIDE 40 MG/1
40 TABLET ORAL DAILY
Qty: 30 TABLET | Refills: 1 | Status: SHIPPED | OUTPATIENT
Start: 2017-11-14

## 2017-11-14 RX ORDER — GUAIFENESIN 600 MG/1
600 TABLET, EXTENDED RELEASE ORAL 2 TIMES DAILY
Qty: 60 TABLET | Refills: 0 | Status: SHIPPED | OUTPATIENT
Start: 2017-11-14

## 2017-11-14 RX ORDER — DOXYCYCLINE HYCLATE 100 MG/1
100 CAPSULE ORAL EVERY 12 HOURS SCHEDULED
Qty: 18 CAPSULE | Refills: 0 | Status: SHIPPED | OUTPATIENT
Start: 2017-11-13 | End: 2017-11-23

## 2017-11-14 RX ORDER — CEFDINIR 300 MG/1
300 CAPSULE ORAL 2 TIMES DAILY
Qty: 18 CAPSULE | Refills: 0 | Status: SHIPPED | OUTPATIENT
Start: 2017-11-14 | End: 2017-11-23

## 2017-11-14 RX ORDER — POTASSIUM CHLORIDE 20 MEQ/1
20 TABLET, EXTENDED RELEASE ORAL DAILY
Status: DISCONTINUED | OUTPATIENT
Start: 2017-11-14 | End: 2017-11-14 | Stop reason: HOSPADM

## 2017-11-14 RX ORDER — ASPIRIN 81 MG/1
81 TABLET, CHEWABLE ORAL DAILY
Qty: 30 TABLET | Refills: 3 | COMMUNITY
Start: 2017-11-15

## 2017-11-14 RX ADMIN — INSULIN LISPRO 2 UNITS: 100 INJECTION, SOLUTION INTRAVENOUS; SUBCUTANEOUS at 13:00

## 2017-11-14 RX ADMIN — GLYBURIDE 5 MG: 5 TABLET ORAL at 09:10

## 2017-11-14 RX ADMIN — METOPROLOL TARTRATE 25 MG: 25 TABLET, FILM COATED ORAL at 09:10

## 2017-11-14 RX ADMIN — FUROSEMIDE 40 MG: 10 INJECTION, SOLUTION INTRAMUSCULAR; INTRAVENOUS at 09:10

## 2017-11-14 RX ADMIN — IPRATROPIUM BROMIDE AND ALBUTEROL SULFATE 1 AMPULE: .5; 3 SOLUTION RESPIRATORY (INHALATION) at 14:48

## 2017-11-14 RX ADMIN — ASPIRIN 81 MG CHEWABLE TABLET 81 MG: 81 TABLET CHEWABLE at 09:10

## 2017-11-14 RX ADMIN — GUAIFENESIN 600 MG: 600 TABLET, EXTENDED RELEASE ORAL at 09:10

## 2017-11-14 RX ADMIN — IPRATROPIUM BROMIDE AND ALBUTEROL SULFATE 1 AMPULE: .5; 3 SOLUTION RESPIRATORY (INHALATION) at 03:12

## 2017-11-14 RX ADMIN — ENOXAPARIN SODIUM 90.7 MG: 120 INJECTION SUBCUTANEOUS at 09:08

## 2017-11-14 RX ADMIN — INSULIN LISPRO 1 UNITS: 100 INJECTION, SOLUTION INTRAVENOUS; SUBCUTANEOUS at 09:17

## 2017-11-14 RX ADMIN — IPRATROPIUM BROMIDE AND ALBUTEROL SULFATE 1 AMPULE: .5; 3 SOLUTION RESPIRATORY (INHALATION) at 07:02

## 2017-11-14 RX ADMIN — IPRATROPIUM BROMIDE AND ALBUTEROL SULFATE 1 AMPULE: .5; 3 SOLUTION RESPIRATORY (INHALATION) at 10:51

## 2017-11-14 RX ADMIN — POTASSIUM CHLORIDE 20 MEQ: 20 TABLET, EXTENDED RELEASE ORAL at 16:53

## 2017-11-14 RX ADMIN — DOXYCYCLINE HYCLATE 100 MG: 100 CAPSULE, GELATIN COATED ORAL at 09:10

## 2017-11-14 RX ADMIN — CEFTRIAXONE 1 G: 1 INJECTION, SOLUTION INTRAVENOUS at 12:46

## 2017-11-14 ASSESSMENT — PAIN SCALES - GENERAL: PAINLEVEL_OUTOF10: 0

## 2017-11-14 NOTE — DISCHARGE SUMMARY
Discharge Summary    Patient ID: Shae Raya      Patient's PCP: BHAVYA Molina    Admit Date: 11/13/2017     Discharge Date:   11/14/2017    Admitting Physician: Rosalie Hutchison MD     Discharge Physician: Dr. Brandon Shepard    Consultants:   1. Dr. Tegan Gallegos, Cardiology    Discharge Diagnoses:    1. Subendocardial Myocardial Infarction - Patient refused work-up. 2. Known CAD with PMHx Previous PCI/Stent  3. Bilateral Pneumonia  4. Acute Hypoxemic Respiratory Failure  5. Bilateral Pleural Effusions, Small           Hospital Course:   Mr. Carolyn Seth is a 69year old who presented to the ER with a 2 week history of increasing shortness of breath and cough. Stated his cough became productive with yellow-greenish sputum 3 days prior to presentation with chills. Work-up in the ER included CXR which revealed bilateral pneumonia with small pleural effusions. Labs were unremarkable. Patient denied chest pain, but with PMHx CAD, cardiac enzymes were checked. Initial troponin was negative, but repeat was elevated. He was seen in consultation by Dr. Zahraa Monroe, Cardiology, who recommended cardiac catheterization. Patient and his wife were Kobe Davis about having work-up at [address] [place] [address] where his regular cardiologist is. Patient asked to sign out AMA. Exam:   Vital Signs: /61   Pulse 73   Temp 97.8 °F (36.6 °C) (Temporal)   Resp 18   Ht 5' 11\" (1.803 m)   Wt 189 lb 6.4 oz (85.9 kg)   SpO2 98%   BMI 26.42 kg/m²   General appearance: No apparent distress, appears stated age and cooperative. HEENT: Normocephalic. Atraumatic. CIELO. Neck: Supple. No JVD. Respiratory:  Normal respiratory effort. Clear to auscultation bilaterally without rales, wheezes, or rhonchi. Cardiovascular: Regular rate and rhythm with normal heart tones without murmurs, rubs or gallops. Abdomen: Soft, non-tender, non-distended with normal bowel sounds.   Extremities: No clubbing, cyanosis or edema bilaterally. Full range of motion without deformity. Neurologic:  Grossly intact. Psychiatric: Alert and oriented, thought content appropriate, normal insight. Activity: Activity as tolerated    Diet: Cardiac, 2gm Na, Low Fat    Labs:  For convenience and continuity at follow-up the following most recent labs are provided:    CBC:   Recent Labs      11/13/17   0556  11/13/17   1153  11/14/17   0210   WBC  10.5  10.8  9.5   HGB  14.5  13.6*  12.6*   HCT  42.6  39.9*  37.2*   MCV  93.4  93.2  93.9   PLT  260  237  231     BMP:    Recent Labs      11/13/17   0556  11/13/17   0603  11/14/17   0210   NA  139   --   139   K  3.6  3.1  3.3*   CL  99   --   97*   CO2  22   --   28   BUN  13   --   22   CREATININE  0.9   --   1.1     LIVER PROFILE:   Recent Labs      11/13/17   0556  11/14/17   0210   AST  15  16   ALT  11  10   BILITOT  0.4  0.3   ALKPHOS  62  44     PT/INR:   Recent Labs      11/13/17   1131  11/14/17   0210   PROTIME  12.9  14.0   INR  0.98  1.09         Discharge Medications:     Current Discharge Medication List           Details   aspirin 81 MG chewable tablet Take 1 tablet by mouth daily  Qty: 30 tablet, Refills: 3      metoprolol tartrate (LOPRESSOR) 25 MG tablet Take 1 tablet by mouth 2 times daily  Qty: 60 tablet, Refills: 1      cefdinir (OMNICEF) 300 MG capsule Take 1 capsule by mouth 2 times daily for 9 days  Qty: 18 capsule, Refills: 0      guaiFENesin (MUCINEX) 600 MG extended release tablet Take 1 tablet by mouth 2 times daily  Qty: 60 tablet, Refills: 0      furosemide (LASIX) 40 MG tablet Take 1 tablet by mouth daily  Qty: 30 tablet, Refills: 1      potassium chloride (KLOR-CON M) 20 MEQ extended release tablet Take 1 tablet by mouth daily  Qty: 30 tablet, Refills: 1      doxycycline hyclate (VIBRAMYCIN) 100 MG capsule Take 1 capsule by mouth every 12 hours for 10 days  Qty: 18 capsule, Refills: 0              Details   glyBURIDE (DIABETA) 5 MG tablet Take 5 mg by mouth daily (with

## 2017-11-14 NOTE — PROGRESS NOTES
[address] [facility]  Progress Note                            Date:  11/14/2017  Patient: Reji Crandall  Admission:  11/13/2017  5:34 AM  Admit DX: Pleural effusion [J90]  Age:  69 y.o., 1940     LOS: 1 day     Reason for evaluation:   Elevated troponin      SUBJECTIVE:    The patient was seen and examined. Notes and labs reviewed. There were not complications over night. Patient's cardiac review of systems: negative for chest pain. The patient is  stable. Denies chest pain. Pt. And wife still do not want any further cardiac work up here. They have placed a call to Dr. Kristin Mcmullen at [facility] to discuss.       OBJECTIVE:    Telemetry: Sinus       ipratropium-albuterol  1 ampule Inhalation Q4H    insulin lispro  0-6 Units Subcutaneous TID WC    insulin lispro  0-3 Units Subcutaneous Nightly    furosemide  40 mg Intravenous Daily    cefTRIAXone (ROCEPHIN) IV  1 g Intravenous Q24H    guaiFENesin  600 mg Oral BID    doxycycline hyclate  100 mg Oral 2 times per day    glyBURIDE  5 mg Oral Daily with breakfast    sodium chloride flush  10 mL Intravenous 2 times per day    aspirin  81 mg Oral Daily    metoprolol tartrate  25 mg Oral BID    enoxaparin  1 mg/kg Subcutaneous BID        dextrose             /63   Pulse 84   Temp 98.6 °F (37 °C) (Temporal)   Resp 18   Ht 5' 11\" (1.803 m)   Wt 189 lb 6.4 oz (85.9 kg)   SpO2 96%   BMI 26.42 kg/m²     Intake/Output Summary (Last 24 hours) at 11/14/17 1156  Last data filed at 11/14/17 0824   Gross per 24 hour   Intake              620 ml   Output             1225 ml   Net             -605 ml     Vishal Blair MD 11/13/2017    Narrative     Transthoracic Echocardiography Report (TTE)     Demographics      Patient Name   Kaylen Langston of Study            11/13/2017      MRN            [id number]        Gender                   Male      Date of Birth  1940    Room Number              [id number]      Age            2 year(s)      Height:        71 inches     Referring Physician      SUELLEN VALADEZ      Weight:        200 pounds    Sonographer              Nicky Chavarria [facility]      BSA:           2.11 m^2      Interpreting Physician   Sergey Virk MD      BMI:           27.89 kg/m^2     Procedure    Type of Study      TTE procedure:ECHO NO CONTRAST WITH DOP/COLR.     Study Location: Echo Lab  Technical Quality: Adequate visualization    Patient Status: Inpatient    Indications:Dyspnea/SOB.     Conclusions      Summary   Left ventricular size is normal .   Mild concentric left ventricular hypertrophy.   Left ventricular ejection fraction is visually estimated at approximately   35%.     Signature      ----------------------------------------------------------------   Electronically signed by Sergey Virk MD(Interpreting   physician) on 11/13/2017 05:49 PM   ----------------------------------------------------------------      Findings      Mitral Valve   Mitral valve leaflets are mildly thickened with preserved leaflet   mobility.   Moderate mitral regurgitation is present.      Aortic Valve   Aortic valve leaflets are moderately thickened.      Tricuspid Valve   Trace tricuspid regurgitation .      Pulmonic Valve   The pulmonic valve was not well visualized .      Left Atrium   Moderately dilated left atrium.      Left Ventricle   Left ventricular size is normal .   Mild concentric left ventricular hypertrophy.   Left ventricular ejection fraction is visually estimated at approximately   35%.     Right Atrium   Normal right atrial dimension with no evidence of thrombus or mass noted.      Right Ventricle   Normal right ventricular size with preserved RV function.      Pericardial Effusion   No evidence of significant pericardial effusion is noted.     M-Mode Measurements (cm)      LVIDd: 6 cm                              LVIDs: 4.5 cm   IVSd: 1.2 cm   LVPWd: 1.19 cm                           AO Root Dimension: 3 cm   Rt. regular  Abdominal -  Soft. Bowel sounds present. Nontender. Extremities -  no lower extremity edema is detected      Assessment/Plan: The patient appears to have sustained a subendocardial myocardial infarction. Catheterization has been recommended. The patient and family wish to have this study done in [place]. I feel that his transfer should occur in an ambulance and supervised in  conjunction with the receiving physician        Electronically signed: Wiliam Mccoy.  Krystal Deluca MD

## 2017-11-14 NOTE — PROGRESS NOTES
Nutrition Assessment    Type and Reason for Visit: Initial, Positive Nutrition Screen    Nutrition Recommendations: continue current POC    Malnutrition Assessment:  · Malnutrition Status: No malnutrition    Nutrition Diagnosis:   · Problem: No nutrition diagnosis at this time  · Etiology: related to       Signs and symptoms:  as evidenced by Intake 25-50%, Intake 0-25%, Intake 50-75%    Nutrition Assessment:  · Subjective Assessment: Positive nutrition screen for  food preferences--No Pork. Appetite is improving with intake now 50-75%. Has increased Carb Control to 4 .  · Nutrition-Focused Physical Findings: well nourished appearing gentleman  · Wound Type: None  · Current Nutrition Therapies:  · Oral Diet Orders: Carb Control 4 Carbs/Meal, Cardiac   · Oral Diet intake: 1-25%, 51-75%  · Oral Nutrition Supplement (ONS) Orders: None    · Anthropometric Measures:  · Ht: 5' 11\" (180.3 cm)   · Current Body Wt: 189 lb (85.7 kg)  · Admission Body Wt:    · Usual Body Wt:    · % Weight Change:  ,     · Ideal Body Wt: 172 lb (78 kg),    · BMI Classification: BMI 25.0 - 29.9 Overweight    Estimated Intake vs Estimated Needs: Intake Improving    Nutrition Risk Level: Moderate    Nutrition Interventions:   Continue current diet  Continued Inpatient Monitoring    Nutrition Evaluation:   · Evaluation: Goals set   · Goals: meet nutritional needs through PO intake    · Monitoring: Meal Intake, Diet Tolerance, Skin Integrity, Weight, Pertinent Labs    See Adult Nutrition Doc Flowsheet for more detail.      Electronically signed by Osiel Reina, MS, RD, LD on 11/14/17 at 11:39 AM    Contact Number: [phone]

## 2017-11-14 NOTE — PLAN OF CARE
Problem: Pain:  Goal: Pain level will decrease  Pain level will decrease   Outcome: Ongoing    Goal: Control of acute pain  Control of acute pain   Outcome: Ongoing    Goal: Control of chronic pain  Control of chronic pain   Outcome: Ongoing      Problem: Falls - Risk of:  Goal: Will remain free from falls  Will remain free from falls   Outcome: Ongoing    Goal: Absence of physical injury  Absence of physical injury   Outcome: Ongoing      Problem: Discharge Planning:  Goal: Discharged to appropriate level of care  Discharged to appropriate level of care  Outcome: Ongoing      Problem: Serum Glucose Level - Abnormal:  Goal: Ability to maintain appropriate glucose levels will improve  Ability to maintain appropriate glucose levels will improve  Outcome: Ongoing      Problem: Sensory Perception - Impaired:  Goal: Ability to maintain a stable neurologic state will improve  Ability to maintain a stable neurologic state will improve  Outcome: Ongoing

## 2017-11-14 NOTE — PLAN OF CARE
Problem: Breathing Pattern - Ineffective:  Goal: Ability to achieve and maintain a regular respiratory rate will improve  Ability to achieve and maintain a regular respiratory rate will improve   Outcome: Ongoing

## 2017-11-15 LAB
CULTURE, RESPIRATORY: NORMAL
EKG P AXIS: 62 DEGREES
EKG P-R INTERVAL: 174 MS
EKG Q-T INTERVAL: 394 MS
EKG QRS DURATION: 162 MS
EKG QTC CALCULATION (BAZETT): 437 MS
EKG T AXIS: 78 DEGREES
GRAM STAIN RESULT: NORMAL

## 2017-11-18 LAB
BLOOD CULTURE, ROUTINE: NORMAL
CULTURE, BLOOD 2: NORMAL

## 2017-12-11 LAB
EKG P AXIS: -8 DEGREES
EKG P-R INTERVAL: 152 MS
EKG Q-T INTERVAL: 400 MS
EKG QRS DURATION: 142 MS
EKG QTC CALCULATION (BAZETT): 435 MS
EKG T AXIS: 11 DEGREES

## 2018-05-05 ENCOUNTER — APPOINTMENT (OUTPATIENT)
Dept: GENERAL RADIOLOGY | Facility: HOSPITAL | Age: 78
End: 2018-05-05

## 2018-05-05 ENCOUNTER — HOSPITAL ENCOUNTER (INPATIENT)
Facility: HOSPITAL | Age: 78
LOS: 4 days | Discharge: HOME | End: 2018-05-09
Attending: EMERGENCY MEDICINE | Admitting: FAMILY MEDICINE

## 2018-05-05 DIAGNOSIS — E87.5 HYPERKALEMIA: Primary | ICD-10-CM

## 2018-05-05 LAB
ALBUMIN SERPL-MCNC: 3.7 G/DL (ref 3.5–5)
ALBUMIN/GLOB SERPL: 1.2 G/DL (ref 1.1–2.5)
ALP SERPL-CCNC: 45 U/L (ref 24–120)
ALT SERPL W P-5'-P-CCNC: 81 U/L (ref 0–54)
ANION GAP SERPL CALCULATED.3IONS-SCNC: 18 MMOL/L (ref 4–13)
AST SERPL-CCNC: 98 U/L (ref 7–45)
BASOPHILS # BLD AUTO: 0.06 10*3/MM3 (ref 0–0.2)
BASOPHILS NFR BLD AUTO: 0.4 % (ref 0–2)
BILIRUB SERPL-MCNC: 1.2 MG/DL (ref 0.1–1)
BUN BLD-MCNC: 19 MG/DL (ref 5–21)
BUN/CREAT SERPL: 14.5 (ref 7–25)
CALCIUM SPEC-SCNC: 8.4 MG/DL (ref 8.4–10.4)
CHLORIDE SERPL-SCNC: 96 MMOL/L (ref 98–110)
CK SERPL-CCNC: 180 U/L (ref 0–203)
CO2 SERPL-SCNC: 18 MMOL/L (ref 24–31)
CREAT BLD-MCNC: 1.31 MG/DL (ref 0.5–1.4)
DEPRECATED RDW RBC AUTO: 42.1 FL (ref 40–54)
EOSINOPHIL # BLD AUTO: 0.1 10*3/MM3 (ref 0–0.7)
EOSINOPHIL NFR BLD AUTO: 0.7 % (ref 0–4)
ERYTHROCYTE [DISTWIDTH] IN BLOOD BY AUTOMATED COUNT: 12.2 % (ref 12–15)
GFR SERPL CREATININE-BSD FRML MDRD: 53 ML/MIN/1.73
GLOBULIN UR ELPH-MCNC: 3.2 GM/DL
GLUCOSE BLD-MCNC: 320 MG/DL (ref 70–100)
HCT VFR BLD AUTO: 35.1 % (ref 40–52)
HGB BLD-MCNC: 11.7 G/DL (ref 14–18)
HOLD SPECIMEN: NORMAL
HOLD SPECIMEN: NORMAL
LYMPHOCYTES # BLD AUTO: 1.36 10*3/MM3 (ref 0.72–4.86)
LYMPHOCYTES NFR BLD AUTO: 9.9 % (ref 15–45)
MAGNESIUM SERPL-MCNC: 1.9 MG/DL (ref 1.4–2.2)
MCH RBC QN AUTO: 31 PG (ref 28–32)
MCHC RBC AUTO-ENTMCNC: 33.3 G/DL (ref 33–36)
MCV RBC AUTO: 92.9 FL (ref 82–95)
MONOCYTES # BLD AUTO: 0.8 10*3/MM3 (ref 0.19–1.3)
MONOCYTES NFR BLD AUTO: 5.8 % (ref 4–12)
NEUTROPHILS # BLD AUTO: 11.3 10*3/MM3 (ref 1.87–8.4)
NEUTROPHILS NFR BLD AUTO: 82.3 % (ref 39–78)
NT-PROBNP SERPL-MCNC: 8850 PG/ML (ref 0–1800)
PLATELET # BLD AUTO: 198 10*3/MM3 (ref 130–400)
PMV BLD AUTO: 11.2 FL (ref 6–12)
POTASSIUM BLD-SCNC: 7.7 MMOL/L (ref 3.5–5.3)
PROT SERPL-MCNC: 6.9 G/DL (ref 6.3–8.7)
RBC # BLD AUTO: 3.78 10*6/MM3 (ref 4.8–5.9)
SODIUM BLD-SCNC: 132 MMOL/L (ref 135–145)
TROPONIN I SERPL-MCNC: 14.2 NG/ML (ref 0–0.03)
TROPONIN I SERPL-MCNC: 14.8 NG/ML (ref 0–0.03)
WBC NRBC COR # BLD: 13.75 10*3/MM3 (ref 4.8–10.8)
WHOLE BLOOD HOLD SPECIMEN: NORMAL
WHOLE BLOOD HOLD SPECIMEN: NORMAL

## 2018-05-05 PROCEDURE — 63710000001 INSULIN REGULAR HUMAN PER 5 UNITS: Performed by: EMERGENCY MEDICINE

## 2018-05-05 PROCEDURE — 99285 EMERGENCY DEPT VISIT HI MDM: CPT

## 2018-05-05 PROCEDURE — 36415 COLL VENOUS BLD VENIPUNCTURE: CPT | Performed by: EMERGENCY MEDICINE

## 2018-05-05 PROCEDURE — 83735 ASSAY OF MAGNESIUM: CPT | Performed by: EMERGENCY MEDICINE

## 2018-05-05 PROCEDURE — 82550 ASSAY OF CK (CPK): CPT | Performed by: EMERGENCY MEDICINE

## 2018-05-05 PROCEDURE — 36600 WITHDRAWAL OF ARTERIAL BLOOD: CPT

## 2018-05-05 PROCEDURE — 93010 ELECTROCARDIOGRAM REPORT: CPT | Performed by: INTERNAL MEDICINE

## 2018-05-05 PROCEDURE — 71045 X-RAY EXAM CHEST 1 VIEW: CPT

## 2018-05-05 PROCEDURE — 25010000002 LORAZEPAM PER 2 MG: Performed by: EMERGENCY MEDICINE

## 2018-05-05 PROCEDURE — 84484 ASSAY OF TROPONIN QUANT: CPT | Performed by: EMERGENCY MEDICINE

## 2018-05-05 PROCEDURE — 93005 ELECTROCARDIOGRAM TRACING: CPT

## 2018-05-05 PROCEDURE — 83880 ASSAY OF NATRIURETIC PEPTIDE: CPT | Performed by: EMERGENCY MEDICINE

## 2018-05-05 PROCEDURE — 80053 COMPREHEN METABOLIC PANEL: CPT | Performed by: EMERGENCY MEDICINE

## 2018-05-05 PROCEDURE — 85025 COMPLETE CBC W/AUTO DIFF WBC: CPT | Performed by: EMERGENCY MEDICINE

## 2018-05-05 PROCEDURE — 93005 ELECTROCARDIOGRAM TRACING: CPT | Performed by: EMERGENCY MEDICINE

## 2018-05-05 RX ORDER — ALBUTEROL SULFATE 2.5 MG/3ML
2.5 SOLUTION RESPIRATORY (INHALATION)
Status: DISCONTINUED | OUTPATIENT
Start: 2018-05-06 | End: 2018-05-09 | Stop reason: HOSPADM

## 2018-05-05 RX ORDER — ACETAMINOPHEN 325 MG/1
650 TABLET ORAL EVERY 4 HOURS PRN
Status: DISCONTINUED | OUTPATIENT
Start: 2018-05-05 | End: 2018-05-09 | Stop reason: HOSPADM

## 2018-05-05 RX ORDER — SODIUM POLYSTYRENE SULFONATE 15 G/60ML
30 SUSPENSION ORAL; RECTAL ONCE
Status: COMPLETED | OUTPATIENT
Start: 2018-05-06 | End: 2018-05-06

## 2018-05-05 RX ORDER — DEXTROSE AND SODIUM CHLORIDE 5; .9 G/100ML; G/100ML
125 INJECTION, SOLUTION INTRAVENOUS CONTINUOUS
Status: DISCONTINUED | OUTPATIENT
Start: 2018-05-06 | End: 2018-05-06

## 2018-05-05 RX ORDER — ASPIRIN 81 MG/1
81 TABLET ORAL DAILY
Status: DISCONTINUED | OUTPATIENT
Start: 2018-05-06 | End: 2018-05-09 | Stop reason: HOSPADM

## 2018-05-05 RX ORDER — ATORVASTATIN CALCIUM 40 MG/1
40 TABLET, FILM COATED ORAL DAILY
COMMUNITY
End: 2018-05-29 | Stop reason: SINTOL

## 2018-05-05 RX ORDER — LISINOPRIL 10 MG/1
10 TABLET ORAL 2 TIMES DAILY
COMMUNITY
End: 2018-05-29 | Stop reason: ALTCHOICE

## 2018-05-05 RX ORDER — ASPIRIN 81 MG/1
81 TABLET ORAL DAILY
COMMUNITY

## 2018-05-05 RX ORDER — NITROGLYCERIN 0.4 MG/1
0.4 TABLET SUBLINGUAL
Status: ON HOLD | COMMUNITY
End: 2018-07-24

## 2018-05-05 RX ORDER — SPIRONOLACTONE 25 MG/1
25 TABLET ORAL DAILY
COMMUNITY
End: 2018-05-29 | Stop reason: ALTCHOICE

## 2018-05-05 RX ORDER — NITROGLYCERIN 0.4 MG/1
0.4 TABLET SUBLINGUAL
Status: DISCONTINUED | OUTPATIENT
Start: 2018-05-05 | End: 2018-05-09 | Stop reason: HOSPADM

## 2018-05-05 RX ORDER — ACETAMINOPHEN 650 MG/1
650 SUPPOSITORY RECTAL EVERY 4 HOURS PRN
Status: DISCONTINUED | OUTPATIENT
Start: 2018-05-05 | End: 2018-05-09 | Stop reason: HOSPADM

## 2018-05-05 RX ORDER — SODIUM CHLORIDE 0.9 % (FLUSH) 0.9 %
10 SYRINGE (ML) INJECTION AS NEEDED
Status: DISCONTINUED | OUTPATIENT
Start: 2018-05-05 | End: 2018-05-09 | Stop reason: HOSPADM

## 2018-05-05 RX ORDER — HYDRALAZINE HYDROCHLORIDE 20 MG/ML
20 INJECTION INTRAMUSCULAR; INTRAVENOUS EVERY 4 HOURS PRN
Status: DISCONTINUED | OUTPATIENT
Start: 2018-05-05 | End: 2018-05-09 | Stop reason: HOSPADM

## 2018-05-05 RX ORDER — CLOPIDOGREL BISULFATE 75 MG/1
75 TABLET ORAL DAILY
Status: DISCONTINUED | OUTPATIENT
Start: 2018-05-06 | End: 2018-05-09 | Stop reason: HOSPADM

## 2018-05-05 RX ORDER — DIAZEPAM 5 MG/ML
5 INJECTION, SOLUTION INTRAMUSCULAR; INTRAVENOUS ONCE
Status: DISCONTINUED | OUTPATIENT
Start: 2018-05-05 | End: 2018-05-05 | Stop reason: ALTCHOICE

## 2018-05-05 RX ORDER — CLOPIDOGREL BISULFATE 75 MG/1
75 TABLET ORAL DAILY
COMMUNITY
End: 2018-07-27 | Stop reason: HOSPADM

## 2018-05-05 RX ORDER — ONDANSETRON 2 MG/ML
4 INJECTION INTRAMUSCULAR; INTRAVENOUS EVERY 6 HOURS PRN
Status: DISCONTINUED | OUTPATIENT
Start: 2018-05-05 | End: 2018-05-09 | Stop reason: HOSPADM

## 2018-05-05 RX ORDER — ASPIRIN 325 MG
325 TABLET ORAL ONCE
Status: DISCONTINUED | OUTPATIENT
Start: 2018-05-05 | End: 2018-05-07

## 2018-05-05 RX ORDER — DEXTROSE MONOHYDRATE 25 G/50ML
25 INJECTION, SOLUTION INTRAVENOUS ONCE
Status: COMPLETED | OUTPATIENT
Start: 2018-05-05 | End: 2018-05-05

## 2018-05-05 RX ORDER — LORAZEPAM 2 MG/ML
1 INJECTION INTRAMUSCULAR ONCE
Status: COMPLETED | OUTPATIENT
Start: 2018-05-05 | End: 2018-05-05

## 2018-05-05 RX ORDER — CALCIUM CHLORIDE 100 MG/ML
1 INJECTION INTRAVENOUS; INTRAVENTRICULAR ONCE
Status: COMPLETED | OUTPATIENT
Start: 2018-05-05 | End: 2018-05-05

## 2018-05-05 RX ORDER — SODIUM CHLORIDE 0.9 % (FLUSH) 0.9 %
1-10 SYRINGE (ML) INJECTION AS NEEDED
Status: DISCONTINUED | OUTPATIENT
Start: 2018-05-05 | End: 2018-05-09 | Stop reason: HOSPADM

## 2018-05-05 RX ADMIN — INSULIN HUMAN 10 UNITS: 100 INJECTION, SOLUTION PARENTERAL at 20:30

## 2018-05-05 RX ADMIN — LORAZEPAM 1 MG: 2 INJECTION INTRAMUSCULAR; INTRAVENOUS at 18:12

## 2018-05-05 RX ADMIN — SODIUM BICARBONATE 50 MEQ: 84 INJECTION, SOLUTION INTRAVENOUS at 20:20

## 2018-05-05 RX ADMIN — SODIUM CHLORIDE 1000 ML: 9 INJECTION, SOLUTION INTRAVENOUS at 18:08

## 2018-05-05 RX ADMIN — ISOPROTERENOL HYDROCHLORIDE 4 MCG/MIN: 0.2 INJECTION, SOLUTION INTRAMUSCULAR; INTRAVENOUS at 18:23

## 2018-05-05 RX ADMIN — CALCIUM CHLORIDE 1 G: 100 INJECTION INTRAVENOUS; INTRAVENTRICULAR at 19:41

## 2018-05-05 RX ADMIN — DEXTROSE MONOHYDRATE 25 G: 25 INJECTION, SOLUTION INTRAVENOUS at 20:16

## 2018-05-05 NOTE — ED PROVIDER NOTES
Subjective   77-year-old gentleman presents from facility via priority 1 ambulance with wife.  Followed the EMS.  The history is collaborated from the EMS personnel and the wife who is at bedside now.  It appears that he was having some weakness fatigue was unable to get off the toilet seat and the wife called 911.  On arrival they noted he was weak and tired and they asked helped onto the stretcher and they noted that his heart rate was in the 25 at that time he received atropine half milligrams and he was transported to our facility.    Wife states that about 3 weeks ago he was at [facility] in [place] where he received a heart catheterization and 3 stents replaced for symptoms very similar to what he experienced today weakness short of breath and fatigue.    Patient himself is really not complaining of anything specific but when asked leading questions he is very attentive and coherent with my questions and answering them appropriately he states he is not having chest pain or shortness of breath but he is weak and tired at this time.        History provided by:  Caregiver  History limited by:  Acuity of condition   used: No        Review of Systems   All other systems reviewed and are negative.      Past Medical History:   Diagnosis Date   • Bundle branch block, right    • CAD, multiple vessel    • CHF (congestive heart failure)    • Diabetes mellitus    • Dyspnea    • Hearing loss    • HLD (hyperlipidemia)    • NSTEMI (non-ST elevated myocardial infarction)    • Parkinsons        Allergies   Allergen Reactions   • Demerol [Meperidine] Delirium       Past Surgical History:   Procedure Laterality Date   • CATARACT EXTRACTION     • CORONARY STENT PLACEMENT         History reviewed. No pertinent family history.    Social History     Social History   • Marital status:      Social History Main Topics   • Smoking status: Never Smoker   • Smokeless tobacco: Never Used   • Alcohol  use No   • Drug use: No   • Sexual activity: Defer     Other Topics Concern   • Not on file           Objective   Physical Exam   Constitutional: He is oriented to person, place, and time. He appears well-developed and well-nourished.   HENT:   Head: Normocephalic and atraumatic.   Eyes: Conjunctivae and EOM are normal. Pupils are equal, round, and reactive to light.   Neck: Normal range of motion. Neck supple.   Cardiovascular:   Bradycardic   Pulmonary/Chest: Effort normal and breath sounds normal.   Abdominal: Soft. Bowel sounds are normal.   Musculoskeletal: Normal range of motion.   Neurological: He is alert and oriented to person, place, and time.   Skin: Capillary refill takes 2 to 3 seconds.   Psychiatric: He has a normal mood and affect. His behavior is normal. Judgment and thought content normal.   Nursing note and vitals reviewed.      Procedures           ED Course  ED Course as of Jun 07 2211   Sat May 05, 2018   1815 Twelve-lead EKG at 1752 reveals a 21 block with a rate of 46 per minute.  I reviewed the EKG with Dr. Whatley.  [KP]   1819 On the monitor his heart rate drops into the 20s he is being externally paced at this time.  And I started him on a Isopril drip.  [KP]   1857    IMPRESSION:  1. Mild cardiomegaly subtle perihilar interstitial infiltrate suspicious  for early interstitial pulmonary vascular congestion.       [KP]   1908 On reexamination at 650 the heart rate has come up to the 50s and 60s to the pacemaker next not firing at this time.  Toprol was at 4 mg and now down to 2.  [KP]   1909 Isopril is on hold at this time as the heart rate is in the 90s.  [KP]   1909 proBNP: (!) 8,850.0 [KP]   1909 Significant elevation the troponin is 14.8 however it could be secondary to the elevated troponin from upper weeks ago when he had the stent put in we will follow the trend. Troponin I: (!!) 14.800 [KP]   1947 I am awaiting for the potassium initial potassium he called it was 8 however it could  have been hemolyzed hence the review running it again.  With a new albuterol.  At this time given that he again dropped his heart rate into the 30s.  I am giving him calcium chloride 1 g slow intravenously over 10 minutes while we await the potassium level.  [KP]   2004 1. Mild cardiomegaly subtle perihilar interstitial infiltrate suspicious  for early interstitial pulmonary vascular congestion.  [KP]   2007 Test is 7.7.  This is the draw before he received the calcium chloride intravenous 1 g.  At this time I am going to administer one amp of bicarbonate and insulin and glucose.  [KP]   2013 Physical Dr. Whatley patient will be admitted to the ICU as planned his creatinine came back at 1.3 he has an isolated elevated potassium which is consistent most likely with his medications at home lisinopril and Aldactone.  Wife also states that he stopped taking the potassium supplement and Lasix I am wondering whether this is the real situation or in some level confusion the patient has been taking the potassium supplement.  [KP]   2018 proBNP: (!) 8,850.0 []   2018 Troponin I: (!!) 14.200 [KP]   2018 Potassium: (!!) 7.7 [KP]   2037 Reexamination at 8:30 PM patient is sitting at 45° very comfortable conversing with the nurses and joking around heart rate on monitors woefully.  I spoke to Dr. Nazario patient will be admitted to the hospitalist.  Wife also agrees that maybe he inadvertently took the potassium every day because the bottle has been laid out on the kitchen counter and increased confusion the patient might have taken them.  []      ED Course User Index  [] Judie PABLO MD                  MDM  Number of Diagnoses or Management Options  Hyperkalemia:   Diagnosis management comments: His heart rate oscillates between 25-30 with Flexeril pacer he responded to Isopril intravenous drip heart rate went to the 80s and 100.  We held off on lisinopril for some time his heart rate dropped to the 30s we restarted again  and his potassium questionable hemolyzed potassium level was 8 at which time because of his fluctuation a heart rate I proceeded to administer calcium chloride intravenously 1 g over 10 minutes until we wait for the potassium to come back.  He spoke to Dr. Whatley his repeat EKG second EKG that is a questionable ST changes which could be consistent with acute ST elevation however his baseline weight is left axis deviation.  And he has no significant chest discomfort or pain on examination.           Amount and/or Complexity of Data Reviewed  Clinical lab tests: reviewed  Tests in the radiology section of CPT®: reviewed and ordered  Tests in the medicine section of CPT®: reviewed and ordered  Discussion of test results with the performing providers: yes  Discuss the patient with other providers: yes  Independent visualization of images, tracings, or specimens: yes    Risk of Complications, Morbidity, and/or Mortality  Presenting problems: high  Diagnostic procedures: high  Management options: high    Critical Care  Total time providing critical care:  minutes    Patient Progress  Patient progress: improved        Final diagnoses:   Hyperkalemia            Judie PABLO MD  05/05/18 2052       Judie PABLO MD  05/05/18 2122       Judie PABLO MD  06/07/18 2211

## 2018-05-06 LAB
ANION GAP SERPL CALCULATED.3IONS-SCNC: 12 MMOL/L (ref 4–13)
ANION GAP SERPL CALCULATED.3IONS-SCNC: 13 MMOL/L (ref 4–13)
ANION GAP SERPL CALCULATED.3IONS-SCNC: 15 MMOL/L (ref 4–13)
BASOPHILS # BLD AUTO: 0.03 10*3/MM3 (ref 0–0.2)
BASOPHILS NFR BLD AUTO: 0.2 % (ref 0–2)
BUN BLD-MCNC: 22 MG/DL (ref 5–21)
BUN BLD-MCNC: 23 MG/DL (ref 5–21)
BUN BLD-MCNC: 23 MG/DL (ref 5–21)
BUN/CREAT SERPL: 17.2 (ref 7–25)
BUN/CREAT SERPL: 19.3 (ref 7–25)
BUN/CREAT SERPL: 19.8 (ref 7–25)
CALCIUM SPEC-SCNC: 8.9 MG/DL (ref 8.4–10.4)
CALCIUM SPEC-SCNC: 9.3 MG/DL (ref 8.4–10.4)
CALCIUM SPEC-SCNC: 9.4 MG/DL (ref 8.4–10.4)
CHLORIDE SERPL-SCNC: 101 MMOL/L (ref 98–110)
CHLORIDE SERPL-SCNC: 98 MMOL/L (ref 98–110)
CHLORIDE SERPL-SCNC: 99 MMOL/L (ref 98–110)
CK MB SERPL-CCNC: 4.36 NG/ML (ref 0–5)
CK SERPL-CCNC: 161 U/L (ref 0–203)
CO2 SERPL-SCNC: 23 MMOL/L (ref 24–31)
CO2 SERPL-SCNC: 24 MMOL/L (ref 24–31)
CO2 SERPL-SCNC: 24 MMOL/L (ref 24–31)
CREAT BLD-MCNC: 1.11 MG/DL (ref 0.5–1.4)
CREAT BLD-MCNC: 1.19 MG/DL (ref 0.5–1.4)
CREAT BLD-MCNC: 1.34 MG/DL (ref 0.5–1.4)
DEPRECATED RDW RBC AUTO: 40.5 FL (ref 40–54)
EOSINOPHIL # BLD AUTO: 0 10*3/MM3 (ref 0–0.7)
EOSINOPHIL NFR BLD AUTO: 0 % (ref 0–4)
ERYTHROCYTE [DISTWIDTH] IN BLOOD BY AUTOMATED COUNT: 12.2 % (ref 12–15)
GFR SERPL CREATININE-BSD FRML MDRD: 52 ML/MIN/1.73
GFR SERPL CREATININE-BSD FRML MDRD: 59 ML/MIN/1.73
GFR SERPL CREATININE-BSD FRML MDRD: 64 ML/MIN/1.73
GLUCOSE BLD-MCNC: 272 MG/DL (ref 70–100)
GLUCOSE BLD-MCNC: 276 MG/DL (ref 70–100)
GLUCOSE BLD-MCNC: 282 MG/DL (ref 70–100)
GLUCOSE BLDC GLUCOMTR-MCNC: 138 MG/DL (ref 70–130)
GLUCOSE BLDC GLUCOMTR-MCNC: 273 MG/DL (ref 70–130)
GLUCOSE BLDC GLUCOMTR-MCNC: 298 MG/DL (ref 70–130)
GLUCOSE BLDC GLUCOMTR-MCNC: 307 MG/DL (ref 70–130)
HBA1C MFR BLD: 8.2 %
HCT VFR BLD AUTO: 32.7 % (ref 40–52)
HGB BLD-MCNC: 11.2 G/DL (ref 14–18)
IMM GRANULOCYTES # BLD: 0.07 10*3/MM3 (ref 0–0.03)
IMM GRANULOCYTES NFR BLD: 0.6 % (ref 0–5)
LYMPHOCYTES # BLD AUTO: 0.52 10*3/MM3 (ref 0.72–4.86)
LYMPHOCYTES NFR BLD AUTO: 4.2 % (ref 15–45)
MAGNESIUM SERPL-MCNC: 1.9 MG/DL (ref 1.4–2.2)
MCH RBC QN AUTO: 31.1 PG (ref 28–32)
MCHC RBC AUTO-ENTMCNC: 34.3 G/DL (ref 33–36)
MCV RBC AUTO: 90.8 FL (ref 82–95)
MONOCYTES # BLD AUTO: 0.61 10*3/MM3 (ref 0.19–1.3)
MONOCYTES NFR BLD AUTO: 4.9 % (ref 4–12)
MYOGLOBIN SERPL-MCNC: 259.7 NG/ML (ref 0–110)
NEUTROPHILS # BLD AUTO: 11.25 10*3/MM3 (ref 1.87–8.4)
NEUTROPHILS NFR BLD AUTO: 90.1 % (ref 39–78)
NRBC BLD MANUAL-RTO: 0 /100 WBC (ref 0–0)
PHOSPHATE SERPL-MCNC: 1.9 MG/DL (ref 2.5–4.5)
PLATELET # BLD AUTO: 184 10*3/MM3 (ref 130–400)
PMV BLD AUTO: 11.1 FL (ref 6–12)
POTASSIUM BLD-SCNC: 4.3 MMOL/L (ref 3.5–5.3)
POTASSIUM BLD-SCNC: 4.9 MMOL/L (ref 3.5–5.3)
POTASSIUM BLD-SCNC: 7.6 MMOL/L (ref 3.5–5.3)
RBC # BLD AUTO: 3.6 10*6/MM3 (ref 4.8–5.9)
SODIUM BLD-SCNC: 135 MMOL/L (ref 135–145)
SODIUM BLD-SCNC: 136 MMOL/L (ref 135–145)
SODIUM BLD-SCNC: 138 MMOL/L (ref 135–145)
TROPONIN I SERPL-MCNC: 12.9 NG/ML (ref 0–0.03)
WBC NRBC COR # BLD: 12.48 10*3/MM3 (ref 4.8–10.8)

## 2018-05-06 PROCEDURE — 94799 UNLISTED PULMONARY SVC/PX: CPT

## 2018-05-06 PROCEDURE — 93005 ELECTROCARDIOGRAM TRACING: CPT | Performed by: INTERNAL MEDICINE

## 2018-05-06 PROCEDURE — 82962 GLUCOSE BLOOD TEST: CPT

## 2018-05-06 PROCEDURE — 80048 BASIC METABOLIC PNL TOTAL CA: CPT | Performed by: INTERNAL MEDICINE

## 2018-05-06 PROCEDURE — 25810000003 DEXTROSE-NACL PER 500 ML: Performed by: INTERNAL MEDICINE

## 2018-05-06 PROCEDURE — 25010000002 ENOXAPARIN PER 10 MG: Performed by: INTERNAL MEDICINE

## 2018-05-06 PROCEDURE — 99221 1ST HOSP IP/OBS SF/LOW 40: CPT | Performed by: INTERNAL MEDICINE

## 2018-05-06 PROCEDURE — 84484 ASSAY OF TROPONIN QUANT: CPT | Performed by: INTERNAL MEDICINE

## 2018-05-06 PROCEDURE — 63710000001 INSULIN LISPRO (HUMAN) PER 5 UNITS: Performed by: INTERNAL MEDICINE

## 2018-05-06 PROCEDURE — 94640 AIRWAY INHALATION TREATMENT: CPT

## 2018-05-06 PROCEDURE — 85025 COMPLETE CBC W/AUTO DIFF WBC: CPT | Performed by: INTERNAL MEDICINE

## 2018-05-06 PROCEDURE — 82550 ASSAY OF CK (CPK): CPT | Performed by: INTERNAL MEDICINE

## 2018-05-06 PROCEDURE — 93010 ELECTROCARDIOGRAM REPORT: CPT | Performed by: INTERNAL MEDICINE

## 2018-05-06 PROCEDURE — 82553 CREATINE MB FRACTION: CPT | Performed by: INTERNAL MEDICINE

## 2018-05-06 PROCEDURE — 84100 ASSAY OF PHOSPHORUS: CPT | Performed by: INTERNAL MEDICINE

## 2018-05-06 PROCEDURE — 25010000002 FUROSEMIDE PER 20 MG: Performed by: INTERNAL MEDICINE

## 2018-05-06 PROCEDURE — 83036 HEMOGLOBIN GLYCOSYLATED A1C: CPT | Performed by: INTERNAL MEDICINE

## 2018-05-06 PROCEDURE — 83874 ASSAY OF MYOGLOBIN: CPT | Performed by: INTERNAL MEDICINE

## 2018-05-06 PROCEDURE — 83735 ASSAY OF MAGNESIUM: CPT | Performed by: INTERNAL MEDICINE

## 2018-05-06 PROCEDURE — 94760 N-INVAS EAR/PLS OXIMETRY 1: CPT

## 2018-05-06 RX ORDER — FUROSEMIDE 10 MG/ML
40 INJECTION INTRAMUSCULAR; INTRAVENOUS ONCE
Status: COMPLETED | OUTPATIENT
Start: 2018-05-06 | End: 2018-05-06

## 2018-05-06 RX ORDER — NICOTINE POLACRILEX 4 MG
15 LOZENGE BUCCAL
Status: DISCONTINUED | OUTPATIENT
Start: 2018-05-06 | End: 2018-05-09 | Stop reason: HOSPADM

## 2018-05-06 RX ORDER — DEXTROSE MONOHYDRATE 25 G/50ML
25 INJECTION, SOLUTION INTRAVENOUS
Status: DISCONTINUED | OUTPATIENT
Start: 2018-05-06 | End: 2018-05-09 | Stop reason: HOSPADM

## 2018-05-06 RX ADMIN — DEXTROSE AND SODIUM CHLORIDE 125 ML/HR: 5; 900 INJECTION, SOLUTION INTRAVENOUS at 00:47

## 2018-05-06 RX ADMIN — INSULIN LISPRO 4 UNITS: 100 INJECTION, SOLUTION INTRAVENOUS; SUBCUTANEOUS at 20:53

## 2018-05-06 RX ADMIN — ENOXAPARIN SODIUM 40 MG: 40 INJECTION SUBCUTANEOUS at 08:14

## 2018-05-06 RX ADMIN — INSULIN LISPRO 5 UNITS: 100 INJECTION, SOLUTION INTRAVENOUS; SUBCUTANEOUS at 08:14

## 2018-05-06 RX ADMIN — CLOPIDOGREL 75 MG: 75 TABLET, FILM COATED ORAL at 08:14

## 2018-05-06 RX ADMIN — SODIUM POLYSTYRENE SULFONATE 30 G: 15 SUSPENSION ORAL; RECTAL at 00:47

## 2018-05-06 RX ADMIN — ASPIRIN 81 MG: 81 TABLET ORAL at 08:16

## 2018-05-06 RX ADMIN — ALBUTEROL SULFATE 2.5 MG: 2.5 SOLUTION RESPIRATORY (INHALATION) at 00:20

## 2018-05-06 RX ADMIN — ALBUTEROL SULFATE 2.5 MG: 2.5 SOLUTION RESPIRATORY (INHALATION) at 07:03

## 2018-05-06 RX ADMIN — FUROSEMIDE 40 MG: 10 INJECTION, SOLUTION INTRAMUSCULAR; INTRAVENOUS at 00:52

## 2018-05-06 RX ADMIN — INSULIN LISPRO 4 UNITS: 100 INJECTION, SOLUTION INTRAVENOUS; SUBCUTANEOUS at 11:22

## 2018-05-06 RX ADMIN — ALBUTEROL SULFATE 2.5 MG: 2.5 SOLUTION RESPIRATORY (INHALATION) at 19:06

## 2018-05-06 RX ADMIN — ALBUTEROL SULFATE 2.5 MG: 2.5 SOLUTION RESPIRATORY (INHALATION) at 11:22

## 2018-05-06 NOTE — CONSULTS
Referring Provider: No ref. provider found    Reason for Consultation: bradycardia    Chief Complaint:   Chief Complaint   Patient presents with   • Slow Heart Rate       Subjective .     History of present illness:  Nithin Horn is a 77 y.o. yo male with history of CAD, s/p stent placement in the past most recently 2 weeks ago at [facility]. He says he has been feeling bad ever since he was discharged but got much worse yesterday. He was found to have profound bradycardia and marked hyperkalemia which responded to tx and isuprel. He denies any CP.  Chief Complaint   Patient presents with   • Slow Heart Rate   .    History  Past Medical History:   Diagnosis Date   • Bundle branch block, right    • CAD, multiple vessel    • CHF (congestive heart failure)    • Diabetes mellitus    • Dyspnea    • Hearing loss    • HLD (hyperlipidemia)    • NSTEMI (non-ST elevated myocardial infarction)    • Parkinsons    ,   Past Surgical History:   Procedure Laterality Date   • CATARACT EXTRACTION     • CORONARY STENT PLACEMENT     ,   History reviewed. No pertinent family history.,   Social History   Substance Use Topics   • Smoking status: Never Smoker   • Smokeless tobacco: Never Used   • Alcohol use Not on file   ,     Medications  Current Facility-Administered Medications   Medication Dose Route Frequency Provider Last Rate Last Dose   • acetaminophen (TYLENOL) tablet 650 mg  650 mg Oral Q4H PRN Carl Nazario MD        Or   • acetaminophen (TYLENOL) suppository 650 mg  650 mg Rectal Q4H PRN Carl Nazario MD       • albuterol (PROVENTIL) nebulizer solution 0.083% 2.5 mg/3mL  2.5 mg Nebulization Q6H - RT Carl Nazario MD   2.5 mg at 05/06/18 0703   • aspirin EC tablet 81 mg  81 mg Oral Daily Carl Nazario MD   81 mg at 05/06/18 0816   • aspirin tablet 325 mg  325 mg Oral Once Judie PABLO MD       • clopidogrel (PLAVIX) tablet 75 mg  75 mg Oral Daily Carl Nazario MD   75 mg at 05/06/18 0814   • dextrose  (D50W) solution 25 g  25 g Intravenous Q15 Min PRN Carl Nazario MD       • dextrose (GLUTOSE) oral gel 15 g  15 g Oral Q15 Min PRN Carl Nazario MD       • dextrose 5 % and sodium chloride 0.9 % infusion  125 mL/hr Intravenous Continuous Carl Nazario  mL/hr at 05/06/18 0047 125 mL/hr at 05/06/18 0047   • enoxaparin (LOVENOX) syringe 40 mg  40 mg Subcutaneous Q24H Carl Nazario MD   40 mg at 05/06/18 0814   • glucagon (human recombinant) (GLUCAGEN DIAGNOSTIC) injection 1 mg  1 mg Subcutaneous PRN Carl Nazario MD       • hydrALAZINE (APRESOLINE) injection 20 mg  20 mg Intravenous Q4H PRN aCrl Nazario MD       • insulin lispro (humaLOG) injection 2-7 Units  2-7 Units Subcutaneous 4x Daily With Meals & Nightly Carl Nazario MD   5 Units at 05/06/18 0814   • isoproterenol (ISUPREL) 1,000 mcg in sodium chloride 0.9 % 250 mL (4 mcg/mL) infusion  2 mcg/min Intravenous Continuous Judie PABLO MD   Stopped at 05/05/18 1956   • nitroglycerin (NITROSTAT) SL tablet 0.4 mg  0.4 mg Sublingual Q5 Min PRN Carl Nazario MD       • ondansetron (ZOFRAN) injection 4 mg  4 mg Intravenous Q6H PRN Carl Nazario MD       • sodium chloride 0.9 % flush 1-10 mL  1-10 mL Intravenous PRN Carl Nazario MD       • sodium chloride 0.9 % flush 10 mL  10 mL Intravenous PRN Judie PABLO MD           Allergies:  Demerol [meperidine]    Review of Systems  Review of Systems   Constitution: Positive for weakness and malaise/fatigue.   HENT: Negative for nosebleeds.    Cardiovascular: Positive for dyspnea on exertion. Negative for chest pain, claudication, irregular heartbeat, leg swelling, near-syncope, orthopnea, palpitations, paroxysmal nocturnal dyspnea and syncope.   Respiratory: Positive for shortness of breath. Negative for cough and hemoptysis.    Gastrointestinal: Negative for dysphagia, hematemesis and melena.   Genitourinary: Negative for hematuria.   All other systems reviewed and are  "negative.      Objective     Physical Exam:  Patient Vitals for the past 24 hrs:   BP Temp Temp src Pulse Resp SpO2 Height Weight   05/06/18 0920 96/51 - - 88 20 100 % - -   05/06/18 0805 108/61 - - 94 18 99 % - -   05/06/18 0703 - - - 81 20 100 % - -   05/06/18 0515 - - - 92 - 100 % - -   05/06/18 0500 - - - 85 - - - -   05/06/18 0445 105/70 - - 86 - 99 % - -   05/06/18 0400 100/66 - - 84 - 100 % - -   05/06/18 0345 102/67 - - 83 - 100 % - -   05/06/18 0300 98/68 - - 83 - 100 % - -   05/06/18 0245 90/59 - - 80 - 98 % - -   05/06/18 0230 (!) 89/66 - - 81 - 98 % - -   05/06/18 0215 92/63 - - 82 - 98 % - -   05/06/18 0200 98/64 - - 83 - 98 % - -   05/06/18 0145 (!) 88/75 - - 82 - 98 % - -   05/06/18 0130 (!) 84/55 - - 83 - 98 % - -   05/06/18 0115 98/66 - - 87 - 99 % - -   05/06/18 0100 97/62 98.9 °F (37.2 °C) Temporal Art 88 - 100 % - -   05/06/18 0030 91/58 - - 86 - 98 % - -   05/06/18 0020 - - - 90 20 100 % - -   05/06/18 0015 100/67 - - 89 - 100 % - -   05/06/18 0001 (!) 88/59 - - 89 - 98 % - -   05/05/18 2345 99/60 - - 95 - 97 % - -   05/05/18 2330 - - - 97 - 98 % - -   05/05/18 2322 - - - - - - - 82.1 kg (181 lb)   05/05/18 2318 95/58 - - 92 - 98 % - -   05/05/18 2301 111/58 - - 97 - 98 % - -   05/05/18 2246 113/67 - - 102 - 97 % - -   05/05/18 2233 113/74 - - 104 - 98 % - -   05/05/18 2216 108/85 - - 105 - 94 % - -   05/05/18 2203 105/90 - - 108 - 98 % - -   05/05/18 2147 130/68 - - 106 - 94 % - -   05/05/18 2132 109/74 - - 105 - 93 % - -   05/05/18 2118 126/92 - - 106 - 95 % - -   05/05/18 2102 133/79 - - 108 - 96 % - -   05/05/18 2057 121/79 - - (!) 122 - 97 % - -   05/05/18 2046 - 98.6 °F (37 °C) Temporal Art 109 - - 175.3 cm (69\") 82.1 kg (181 lb)   05/05/18 2031 119/76 - - 115 16 98 % - -   05/05/18 2026 - - - 120 - 99 % - -   05/05/18 2021 116/60 - - 120 - 100 % - -   05/05/18 2020 - - - 116 - 100 % - -   05/05/18 2019 - - - 61 - 100 % - -   05/05/18 2016 109/53 - - 112 - 100 % - -   05/05/18 2001 111/58 " "- - (!) 131 18 98 % - -   05/05/18 1958 - - - (!) 141 - 100 % - -   05/05/18 1957 - - - (!) 146 - 97 % - -   05/05/18 1955 - - - (!) 148 - 100 % - -   05/05/18 1952 - - - (!) 146 - 100 % - -   05/05/18 1951 - - - - 19 - - -   05/05/18 1951 - - - 113 - 100 % - -   05/05/18 1950 - - - (!) 147 - 95 % - -   05/05/18 1949 - - - 118 - 98 % - -   05/05/18 1946 105/52 - - 74 - 98 % - -   05/05/18 1943 (!) 87/40 - - 60 - 98 % - -   05/05/18 1942 - - - 75 - - - -   05/05/18 1942 - - - 61 - - - -   05/1941 - - - 82 - 100 % - -   05/05/18 1939 - - - - 25 99 % - -   05/05/18 1939 - - - 56 - - - -   05/05/18 1936 (!) 74/40 - - (!) 36 - 100 % - -   05/05/18 1935 - - - (!) 35 - 98 % - -   05/05/18 1934 - - - (!) 40 - 100 % - -   05/05/18 1933 - - - (!) 41 - 100 % - -   05/05/18 1931 (!) 80/47 97.4 °F (36.3 °C) Oral (!) 49 - 100 % - -   05/05/18 1929 - - - 74 - 100 % - -   05/05/18 1928 114/50 - - 65 24 100 % - -   05/05/18 1928 - - - 65 - 100 % - -   05/05/18 1926 114/50 - - 94 - 100 % - -   05/05/18 1919 - - - 114 - - - -   05/05/18 1917 - - - 115 - - - -   05/05/18 1915 - - - 117 - - - -   05/05/18 1915 - - - 120 - - - -   05/05/18 1910 106/67 - - (!) 128 19 100 % - -   05/05/18 1905 - - - (!) 143 - - - -   05/05/18 1900 - - - 72 - - - -   05/05/18 1856 - - - 74 - 100 % - -   05/05/18 1855 - - - (!) 152 - - - -   05/05/18 1855 - - - 76 - - - -   05/05/18 1853 - - - (!) 128 - - - -   05/05/18 1853 - - - (!) 144 - - - -   05/05/18 1851 - - - 78 - - - -   05/05/18 1848 - - - 76 - - - -   05/05/18 1847 - - - 88 - - - -   05/05/18 1844 114/70 - - 89 - - - -   05/05/18 1843 - - - 78 - - - -   05/05/18 1842 - - - - - 100 % - -   05/05/18 1842 - - - 84 - - - -   05/05/18 1839 - - - 84 - - - -   05/05/18 1837 - - - 76 - - - -   05/05/18 1837 - - - - 22 - - -   05/05/18 1834 - - - 69 - - - -   05/05/18 1832 97/55 - - 59 - - - -   05/05/18 1829 - - - 84 - - - -   05/05/18 1826 - - - - - - 175.3 cm (69\") 81.2 kg (179 lb)   05/05/18 " 1825 - - - (!) 46 - - - -   05/05/18 1824 - - - - - 96 % - -   05/05/18 1824 102/81 - - - - - - -   05/05/18 1823 - - - (!) 44 - - - -   05/05/18 1817 - - - 52 - 100 % - -   05/05/18 1813 - - - (!) 47 - - - -   05/05/18 1813 100/50 - - (!) 34 - - - -   05/05/18 1803 - - - - - 94 % - -   05/05/18 1802 - - - 58 - - - -   05/05/18 1801 - - - 62 - - - -   05/05/18 1756 - - - 50 13 - - -   05/05/18 1754 104/50 - - (!) 25 - - - -   05/05/18 1753 - - - (!) 40 22 - - -     Physical Exam    Results Review:   I reviewed the patient's new clinical results.  Lab Results (last 24 hours)     Procedure Component Value Units Date/Time    Troponin [[id number]]  (Abnormal) Collected:  05/06/18 0804    Specimen:  Blood Updated:  05/06/18 0948     Troponin I 12.900 (C) ng/mL     Basic Metabolic Panel [[id number]]  (Abnormal) Collected:  05/06/18 0804    Specimen:  Blood Updated:  05/06/18 0851     Glucose 282 (H) mg/dL      BUN 23 (H) mg/dL      Creatinine 1.19 mg/dL      Sodium 138 mmol/L      Potassium 4.9 mmol/L      Chloride 99 mmol/L      CO2 24.0 mmol/L      Calcium 9.3 mg/dL      eGFR Non African Amer 59 (L) mL/min/1.73      BUN/Creatinine Ratio 19.3     Anion Gap 15.0 (H) mmol/L     Narrative:       The MDRD GFR formula is only valid for adults with stable renal function between ages 18 and 70.    POC Glucose Once [[id number]]  (Abnormal) Collected:  05/06/18 0630    Specimen:  Blood Updated:  05/06/18 0643     Glucose 307 (H) mg/dL      Comment: : [id number] Rui AutumnMeter ID: [id number]       CK Total & CKMB [[id number]]  (Normal) Collected:  05/06/18 0016    Specimen:  Blood Updated:  05/06/18 0129     CKMB 4.36 ng/mL      Creatine Kinase 161 U/L     Narrative:       CKMB Index not indicated    Myoglobin, Serum [[id number]]  (Abnormal) Collected:  05/06/18 0016    Specimen:  Blood Updated:  05/06/18 0122     Myoglobin 259.7 (H) ng/mL     Hemoglobin A1c [[id number]] Collected:  05/06/18 0016    Specimen:  Blood Updated:   05/06/18 0113     Hemoglobin A1C 8.2 %     Narrative:       Less than 6.0           Non-Diabetic Range  6.0-7.0                 ADA Therapeutic Target  Greater than 7.0        Action Suggested    Basic Metabolic Panel [[id number]]  (Abnormal) Collected:  05/06/18 0016    Specimen:  Blood Updated:  05/06/18 0046     Glucose 276 (H) mg/dL      BUN 23 (H) mg/dL      Creatinine 1.34 mg/dL      Sodium 135 mmol/L      Potassium 7.6 (C) mmol/L      Chloride 98 mmol/L      CO2 24.0 mmol/L      Calcium 9.4 mg/dL      eGFR Non African Amer 52 (L) mL/min/1.73      BUN/Creatinine Ratio 17.2     Anion Gap 13.0 mmol/L     Narrative:       The MDRD GFR formula is only valid for adults with stable renal function between ages 18 and 70.    Phosphorus [[id number]]  (Abnormal) Collected:  05/06/18 0016    Specimen:  Blood Updated:  05/06/18 0042     Phosphorus 1.9 (L) mg/dL     Magnesium [[id number]]  (Normal) Collected:  05/06/18 0016    Specimen:  Blood Updated:  05/06/18 0042     Magnesium 1.9 mg/dL     CBC Auto Differential [[id number]]  (Abnormal) Collected:  05/06/18 0016    Specimen:  Blood Updated:  05/06/18 0024     WBC 12.48 (H) 10*3/mm3      RBC 3.60 (L) 10*6/mm3      Hemoglobin 11.2 (L) g/dL      Hematocrit 32.7 (L) %      MCV 90.8 fL      MCH 31.1 pg      MCHC 34.3 g/dL      RDW 12.2 %      RDW-SD 40.5 fl      MPV 11.1 fL      Platelets 184 10*3/mm3      Neutrophil % 90.1 (H) %      Lymphocyte % 4.2 (L) %      Monocyte % 4.9 %      Eosinophil % 0.0 %      Basophil % 0.2 %      Immature Grans % 0.6 %      Neutrophils, Absolute 11.25 (H) 10*3/mm3      Lymphocytes, Absolute 0.52 (L) 10*3/mm3      Monocytes, Absolute 0.61 10*3/mm3      Eosinophils, Absolute 0.00 10*3/mm3      Basophils, Absolute 0.03 10*3/mm3      Immature Grans, Absolute 0.07 (H) 10*3/mm3      nRBC 0.0 /100 WBC     Troponin [[id number]]  (Abnormal) Collected:  05/05/18 1940    Specimen:  Blood Updated:  05/05/18 2012     Troponin I 14.200 (C) ng/mL      Comprehensive Metabolic Panel [[id number]]  (Abnormal) Collected:  05/05/18 1940    Specimen:  Blood from Arm, Right Updated:  05/05/18 2007     Glucose 320 (H) mg/dL      BUN 19 mg/dL      Creatinine 1.31 mg/dL      Sodium 132 (L) mmol/L      Potassium 7.7 (C) mmol/L      Chloride 96 (L) mmol/L      CO2 18.0 (L) mmol/L      Calcium 8.4 mg/dL      Total Protein 6.9 g/dL      Albumin 3.70 g/dL      ALT (SGPT) 81 (H) U/L      AST (SGOT) 98 (H) U/L      Alkaline Phosphatase 45 U/L      Total Bilirubin 1.2 (H) mg/dL      eGFR Non African Amer 53 (L) mL/min/1.73      Globulin 3.2 gm/dL      A/G Ratio 1.2 g/dL      BUN/Creatinine Ratio 14.5     Anion Gap 18.0 (H) mmol/L     Narrative:       The MDRD GFR formula is only valid for adults with stable renal function between ages 18 and 70.    [facility] Draw [[id number]] Collected:  05/05/18 1805    Specimen:  Blood Updated:  05/05/18 1915    Narrative:       The following orders were created for panel order [place] Draw.  Procedure                               Abnormality         Status                     ---------                               -----------         ------                     Light Blue Top[[id number]]                                   Final result               Green Top (Gel)[[id number]]                                  Final result               Lavender Top[[id number]]                                     Final result               Red Top[[id number]]                                          Final result                 Please view results for these tests on the individual orders.    Light Blue Top [[id number]] Collected:  05/05/18 1805    Specimen:  Blood from Arm, Left Updated:  05/05/18 1915     Extra Tube hold for add-on     Comment: Auto resulted       Green Top (Gel) [[id number]] Collected:  05/05/18 1805    Specimen:  Blood from Arm, Right Updated:  05/05/18 1915     Extra Tube Hold for add-ons.     Comment: Auto resulted.       Lavender Top [[id number]]  Collected:  05/05/18 1805    Specimen:  Blood from Arm, Right Updated:  05/05/18 1915     Extra Tube hold for add-on     Comment: Auto resulted       Red Top [[id number]] Collected:  05/05/18 1805    Specimen:  Blood from Arm, Right Updated:  05/05/18 1915     Extra Tube Hold for add-ons.     Comment: Auto resulted.       Troponin [[id number]]  (Abnormal) Collected:  05/05/18 1805    Specimen:  Blood from Arm, Right Updated:  05/05/18 1900     Troponin I 14.800 (C) ng/mL     BNP [[id number]]  (Abnormal) Collected:  05/05/18 1805    Specimen:  Blood from Arm, Right Updated:  05/05/18 1844     proBNP 8,850.0 (H) pg/mL     CK [[id number]]  (Normal) Collected:  05/05/18 1805    Specimen:  Blood from Arm, Right Updated:  05/05/18 1833     Creatine Kinase 180 U/L     Magnesium [[id number]]  (Normal) Collected:  05/05/18 1805    Specimen:  Blood from Arm, Right Updated:  05/05/18 1833     Magnesium 1.9 mg/dL     CBC & Differential [[id number]] Collected:  05/05/18 1805    Specimen:  Blood Updated:  05/05/18 1826    Narrative:       The following orders were created for panel order CBC & Differential.  Procedure                               Abnormality         Status                     ---------                               -----------         ------                     Manual Differential[[id number]]                                                         CBC Auto Differential[[id number]]        Abnormal            Final result                 Please view results for these tests on the individual orders.    CBC Auto Differential [[id number]]  (Abnormal) Collected:  05/05/18 1805    Specimen:  Blood from Arm, Right Updated:  05/05/18 1826     WBC 13.75 (H) 10*3/mm3      RBC 3.78 (L) 10*6/mm3      Hemoglobin 11.7 (L) g/dL      Hematocrit 35.1 (L) %      MCV 92.9 fL      MCH 31.0 pg      MCHC 33.3 g/dL      RDW 12.2 %      RDW-SD 42.1 fl      MPV 11.2 fL      Platelets 198 10*3/mm3      Neutrophil % 82.3 (H) %      Lymphocyte % 9.9  (L) %      Monocyte % 5.8 %      Eosinophil % 0.7 %      Basophil % 0.4 %      Neutrophils, Absolute 11.30 (H) 10*3/mm3      Lymphocytes, Absolute 1.36 10*3/mm3      Monocytes, Absolute 0.80 10*3/mm3      Eosinophils, Absolute 0.10 10*3/mm3      Basophils, Absolute 0.06 10*3/mm3         Imaging Results (last 24 hours)     Procedure Component Value Units Date/Time    XR Chest 1 View [[id number]] Collected:  05/05/18 1837     Updated:  05/05/18 2005    Narrative:       EXAMINATION:   XR CHEST 1 VW-  5/5/2018 6:37 PM CDT     HISTORY: Chest pain     Frontal upright radiograph of the chest 5/5/2018 6:25 PM CDT     COMPARISON: None.     FINDINGS:   Mild perihilar interstitial infiltrates noted.. The cardiac silhouettes  mildly enlarged. This is suspicious for minimal or early changes of  pulmonary vascular congestion.      The osseous structures and surrounding soft tissues demonstrate no acute  abnormality.       Impression:       1. Mild cardiomegaly subtle perihilar interstitial infiltrate suspicious  for early interstitial pulmonary vascular congestion.        This report was finalized on 05/05/2018 18:38 by Dr. Bala Hart MD.        No results found for: ECHOEFEST  I have reviewed his most recent ECG which reveals SR with RBBB.     Assessment/Plan   Active Problems:    Hyperkalemia    New problems that need assessment:  1. Hyperkalemia due to kdur/aldactone and lisinopril  2. Bradycardia due to hyperkalemia, now resolved  3. Elevated troponin, etiology unclear  4. CHF, will get echo for baseline  New problems that are stable:  1. HTN  2. HLD    MDM HIgh Complexity

## 2018-05-06 NOTE — PROGRESS NOTES
1           [facility]  INPATIENT PROGRESS NOTE    Length of Stay: 1  Date of Admission: 5/5/2018  Primary Care Physician: No Known Provider    Subjective   Chief Complaint:  Follow-up  HPI   77-year-old  man who recently had multiple stents about 3-4 weeks ago in [place] who was transferred by [facility] for hospital to be bradycardic.  Patient received atropine.  He was started transiently on Isuprel.  He was hyperkalemic at 7.7.  Patient received treatment.  Most recent potassium was 4.9.  He has elevated troponin (14.8).  Unclear previous value.  His EKG is abnormal.  He had low blood pressure reading which are now improved at 105/70.   Bradycardic spell likely secondary to beta blocker and hyperkalemia  Medications such as Aldactone, potassium supplement and Ace inhibitors were discontinued.  Cardiology has been consulted  He feels significantly better  He was taken off Lasix however I am told he continued potassium supplement + aldactone.     Review of Systems     All pertinent negatives and positives are as above. All other systems have been reviewed and are negative unless otherwise stated.     Objective    Temp:  [97.4 °F (36.3 °C)-98.9 °F (37.2 °C)] 98.9 °F (37.2 °C)  Heart Rate:  [] 81  Resp:  [13-25] 20  BP: ()/(40-92) 105/70  Physical Exam    Gen. well-nourished well-developed WM in no acute distress  HEENT: Atraumatic normocephalic pupils equal round reactive to light extraocular movements intact sclerae anicteric TMs negative mucous membranes moist neck is supple without lymphadenopathy no JVD is noted no carotid bruits are auscultated oropharynx is without erythema or exudate  Chest: Clear to auscultation percussion  CV: Regular rate and rhythm normal S1-S2 no gallops murmurs or rubs  Abdomen: Soft nontender nondistended active bowel sounds no hepatosplenomegaly no masses no hernias  Extremities: No clubbing edema or  cyanosis capillary refill is normal pulses are 2+ and symmetric at radial and dorsalis pedis posterior tibial pulses are intact and 2+ palpable  Neuro: Patient is awake alert and oriented ×3, cranial nerves II through XII are grossly intact, motor is 5 out of 5 bilaterally, DTRs are 2+ and symmetric bilaterally sensory exam is nonfocal  Skin: Warm dry and intact no evidence of breakdown  Sensorium: Normal thought and affect  Nursing notes and vital signs reviewed    Results Review:  I have reviewed the labs, radiology results, and diagnostic studies.    Laboratory Data:     Results from last 7 days  Lab Units 05/06/18  0016 05/05/18  1805   WBC 10*3/mm3 12.48* 13.75*   HEMOGLOBIN g/dL 11.2* 11.7*   HEMATOCRIT % 32.7* 35.1*   PLATELETS 10*3/mm3 184 198          Results from last 7 days  Lab Units 05/06/18  0804 05/06/18  0016 05/05/18  1940   SODIUM mmol/L 138 135 132*   POTASSIUM mmol/L 4.9 7.6* 7.7*   CHLORIDE mmol/L 99 98 96*   CO2 mmol/L 24.0 24.0 18.0*   BUN mg/dL 23* 23* 19   CREATININE mg/dL 1.19 1.34 1.31   CALCIUM mg/dL 9.3 9.4 8.4   BILIRUBIN mg/dL  --   --  1.2*   ALK PHOS U/L  --   --  45   ALT (SGPT) U/L  --   --  81*   AST (SGOT) U/L  --   --  98*   GLUCOSE mg/dL 282* 276* 320*       Culture Data:        Radiology Data:   Imaging Results (last 24 hours)     Procedure Component Value Units Date/Time    XR Chest 1 View [[id number]] Collected:  05/05/18 1837     Updated:  05/05/18 2005    Narrative:       EXAMINATION:   XR CHEST 1 VW-  5/5/2018 6:37 PM CDT     HISTORY: Chest pain     Frontal upright radiograph of the chest 5/5/2018 6:25 PM CDT     COMPARISON: None.     FINDINGS:   Mild perihilar interstitial infiltrates noted.. The cardiac silhouettes  mildly enlarged. This is suspicious for minimal or early changes of  pulmonary vascular congestion.      The osseous structures and surrounding soft tissues demonstrate no acute  abnormality.       Impression:       1. Mild cardiomegaly subtle perihilar  "interstitial infiltrate suspicious  for early interstitial pulmonary vascular congestion.        This report was finalized on 05/05/2018 18:38 by Dr. Bala Hart MD.          I have reviewed the patient current medications.     Assessment/Plan     Hospital Problem List     Hyperkalemia        Recent NSTEMI requiring multiple stents - obtain record from outside hospital (requested through \"request outside tab)  Symptomatic Bradycardia felt to be multifactorial (beta blocker/hyperkalemia)  Type 2 diabetes mellitus/hyperglycemia  Hyponatremia - improve  Mild leukocytosis - improving     Awaiting recommendation  from cardiology  Continue sliding scale insulin  Cont  aspirin and Plavix  Change BMP frequency (less frequent)    albuterol 2.5 mg Nebulization Q6H - RT   aspirin 81 mg Oral Daily   aspirin 325 mg Oral Once   clopidogrel 75 mg Oral Daily   enoxaparin 40 mg Subcutaneous Q24H   insulin lispro 2-7 Units Subcutaneous 4x Daily With Meals & Nightly     ? To 4B if ok with cardiologist  Discussed with nurse.          Discharge Planning: to be determined    Lobo Benton MD   05/06/18   9:22 AM    "

## 2018-05-06 NOTE — PLAN OF CARE
Problem: Fall Risk (Adult)  Goal: Identify Related Risk Factors and Signs and Symptoms  Outcome: Ongoing (interventions implemented as appropriate)    Goal: Absence of Fall  Outcome: Ongoing (interventions implemented as appropriate)      Problem: Patient Care Overview  Goal: Plan of Care Review  Outcome: Ongoing (interventions implemented as appropriate)   05/06/18 6837   Coping/Psychosocial   Plan of Care Reviewed With patient   Plan of Care Review   Progress no change   OTHER   Outcome Summary VSS. No c/o pain this shift. Potassium now stable at 4.9. Sats WNL on RA. NSR on tele. Repeat labs in the morning to reassess. Cont to monitor.        Problem: Arrhythmia/Dysrhythmia (Symptomatic) (Adult)  Goal: Signs and Symptoms of Listed Potential Problems Will be Absent, Minimized or Managed (Arrhythmia/Dysrhythmia)  Outcome: Ongoing (interventions implemented as appropriate)

## 2018-05-06 NOTE — PLAN OF CARE
Problem: Fall Risk (Adult)  Goal: Identify Related Risk Factors and Signs and Symptoms  Outcome: Ongoing (interventions implemented as appropriate)    Goal: Absence of Fall  Outcome: Ongoing (interventions implemented as appropriate)      Problem: Patient Care Overview  Goal: Plan of Care Review  Outcome: Ongoing (interventions implemented as appropriate)    Goal: Individualization and Mutuality  Outcome: Ongoing (interventions implemented as appropriate)    Goal: Discharge Needs Assessment  Outcome: Ongoing (interventions implemented as appropriate)    Goal: Interprofessional Rounds/Family Conf  Outcome: Ongoing (interventions implemented as appropriate)      Problem: Arrhythmia/Dysrhythmia (Symptomatic) (Adult)  Goal: Signs and Symptoms of Listed Potential Problems Will be Absent, Minimized or Managed (Arrhythmia/Dysrhythmia)  Outcome: Ongoing (interventions implemented as appropriate)

## 2018-05-06 NOTE — H&P
[facility] Medicine Services  HISTORY AND PHYSICAL    Date of Admission: 5/5/2018  Primary Care Physician: No Known Provider    Subjective     Chief Complaint: Weakness    History of Present Illness  Patient is 77-year-old white male past medical history of coronary artery disease with multiple stents most recently about 3-4 weeks ago down at [place].  He presents with increasing weakness fatigue for the last few days.  He he was found to be in heart block with a rate in the 20s to 30s initially.  In the ER was evaluated and found to have a potassium of greater than 7.  He has been given calcium chloride D50 insulin and bicarbonate placed on Isuprel drip with improvement of his symptoms.  He also has evidence of heart failure on his chest x-ray and from his labs.  He is on a beta blocker which was not initially known.  He is also on Aldactone and potassium.  He had previously been on Lasix but this was discontinued by his cardiologist a few days ago.  He is being admitted for further evaluation and management.  He also has an elevated troponin which apparently is a chronic issue per the family.        Review of Systems   14 point review systems negative except for as per HPI  Otherwise complete ROS reviewed and negative except as mentioned in the HPI.    Past Medical History:   Past Medical History:   Diagnosis Date   • Bundle branch block, right    • CAD, multiple vessel    • CHF (congestive heart failure)    • Diabetes mellitus    • Dyspnea    • Hearing loss    • HLD (hyperlipidemia)    • NSTEMI (non-ST elevated myocardial infarction)    • Parkinsons      Past Surgical History:  Past Surgical History:   Procedure Laterality Date   • CATARACT EXTRACTION     • CORONARY STENT PLACEMENT       Social History:  reports that he has never smoked. He has never used smokeless tobacco. He reports that he does not use drugs.    Family History: family history is not on file.   Positive  "for coronary artery disease in both parents    Allergies:  Allergies   Allergen Reactions   • Demerol [Meperidine] Delirium     Medications:  Prior to Admission medications    Medication Sig Start Date End Date Taking? Authorizing Provider   aspirin 81 MG EC tablet Take 81 mg by mouth Daily.   Yes Historical Provider, MD   atorvastatin (LIPITOR) 40 MG tablet Take 40 mg by mouth Daily.   Yes Historical Provider, MD   benazepril (LOTENSIN) 10 MG tablet Take 10 mg by mouth Daily.   Yes Historical Provider, MD   clopidogrel (PLAVIX) 75 MG tablet Take 75 mg by mouth Daily.   Yes Historical Provider, MD   glyBURIDE (DIABETA) 1.25 MG tablet Take 5 mg by mouth Daily With Breakfast.   Yes Historical Provider, MD   lisinopril (PRINIVIL,ZESTRIL) 10 MG tablet Take 10 mg by mouth 2 (Two) Times a Day.   Yes Historical Provider, MD   metoprolol tartrate (LOPRESSOR) 25 MG tablet Take 25 mg by mouth 2 (Two) Times a Day.   Yes Historical Provider, MD   nitroglycerin (NITROSTAT) 0.4 MG SL tablet Place 0.4 mg under the tongue Every 5 (Five) Minutes As Needed for Chest Pain. Take no more than 3 doses in 15 minutes.   Yes Historical Provider, MD   spironolactone (ALDACTONE) 25 MG tablet Take 25 mg by mouth Daily.   Yes Historical Provider, MD     Objective     Vital Signs: BP (!) 88/59   Pulse 89   Temp 98.6 °F (37 °C) (Temporal Artery )   Resp 16   Ht 175.3 cm (69\")   Wt 82.1 kg (181 lb)   SpO2 98%   BMI 26.73 kg/m²   Physical Exam  Gen. well-nourished well-developed WM in no acute distress  HEENT: Atraumatic normocephalic pupils equal round reactive to light extraocular movements intact sclerae anicteric TMs negative mucous membranes moist neck is supple without lymphadenopathy no JVD is noted no carotid bruits are auscultated oropharynx is without erythema or exudate  Chest: Clear to auscultation percussion  CV: Regular rate and rhythm normal S1-S2 no gallops murmurs or rubs  Abdomen: Soft nontender nondistended active bowel " sounds no hepatosplenomegaly no masses no hernias  Extremities: No clubbing edema or cyanosis capillary refill is normal pulses are 2+ and symmetric at radial and dorsalis pedis posterior tibial pulses are intact and 2+ palpable  Neuro: Patient is awake alert and oriented ×3, cranial nerves II through XII are grossly intact, motor is 5 out of 5 bilaterally, DTRs are 2+ and symmetric bilaterally sensory exam is nonfocal  Skin: Warm dry and intact no evidence of breakdown  Sensorium: Normal thought and affect  Nursing notes and vital signs reviewed        Results Reviewed:  Lab Results (last 24 hours)     Procedure Component Value Units Date/Time    Troponin [[id number]]  (Abnormal) Collected:  05/05/18 1940    Specimen:  Blood Updated:  05/05/18 2012     Troponin I 14.200 (C) ng/mL     Comprehensive Metabolic Panel [[id number]]  (Abnormal) Collected:  05/05/18 1940    Specimen:  Blood from Arm, Right Updated:  05/05/18 2007     Glucose 320 (H) mg/dL      BUN 19 mg/dL      Creatinine 1.31 mg/dL      Sodium 132 (L) mmol/L      Potassium 7.7 (C) mmol/L      Chloride 96 (L) mmol/L      CO2 18.0 (L) mmol/L      Calcium 8.4 mg/dL      Total Protein 6.9 g/dL      Albumin 3.70 g/dL      ALT (SGPT) 81 (H) U/L      AST (SGOT) 98 (H) U/L      Alkaline Phosphatase 45 U/L      Total Bilirubin 1.2 (H) mg/dL      eGFR Non African Amer 53 (L) mL/min/1.73      Globulin 3.2 gm/dL      A/G Ratio 1.2 g/dL      BUN/Creatinine Ratio 14.5     Anion Gap 18.0 (H) mmol/L     Narrative:       The MDRD GFR formula is only valid for adults with stable renal function between ages 18 and 70.    Decker Draw [[id number]] Collected:  05/05/18 1805    Specimen:  Blood Updated:  05/05/18 1915    Narrative:       The following orders were created for panel order Decker Draw.  Procedure                               Abnormality         Status                     ---------                               -----------         ------                     Light  Blue Top[[id number]]                                   Final result               Green Top (Gel)[[id number]]                                  Final result               Lavender Top[[id number]]                                     Final result               Red Top[[id number]]                                          Final result                 Please view results for these tests on the individual orders.    Light Blue Top [[id number]] Collected:  05/05/18 1805    Specimen:  Blood from Arm, Left Updated:  05/05/18 1915     Extra Tube hold for add-on     Comment: Auto resulted       Green Top (Gel) [[id number]] Collected:  05/05/18 1805    Specimen:  Blood from Arm, Right Updated:  05/05/18 1915     Extra Tube Hold for add-ons.     Comment: Auto resulted.       Lavender Top [[id number]] Collected:  05/05/18 1805    Specimen:  Blood from Arm, Right Updated:  05/05/18 1915     Extra Tube hold for add-on     Comment: Auto resulted       Red Top [[id number]] Collected:  05/05/18 1805    Specimen:  Blood from Arm, Right Updated:  05/05/18 1915     Extra Tube Hold for add-ons.     Comment: Auto resulted.       Troponin [[id number]]  (Abnormal) Collected:  05/05/18 1805    Specimen:  Blood from Arm, Right Updated:  05/05/18 1900     Troponin I 14.800 (C) ng/mL     BNP [[id number]]  (Abnormal) Collected:  05/05/18 1805    Specimen:  Blood from Arm, Right Updated:  05/05/18 1844     proBNP 8,850.0 (H) pg/mL     CK [[id number]]  (Normal) Collected:  05/05/18 1805    Specimen:  Blood from Arm, Right Updated:  05/05/18 1833     Creatine Kinase 180 U/L     Magnesium [[id number]]  (Normal) Collected:  05/05/18 1805    Specimen:  Blood from Arm, Right Updated:  05/05/18 1833     Magnesium 1.9 mg/dL     CBC & Differential [[id number]] Collected:  05/05/18 1805    Specimen:  Blood Updated:  05/05/18 1826    Narrative:       The following orders were created for panel order CBC & Differential.  Procedure                               Abnormality          Status                     ---------                               -----------         ------                     Manual Differential[[id number]]                                                         CBC Auto Differential[[id number]]        Abnormal            Final result                 Please view results for these tests on the individual orders.    CBC Auto Differential [[id number]]  (Abnormal) Collected:  05/05/18 1805    Specimen:  Blood from Arm, Right Updated:  05/05/18 1826     WBC 13.75 (H) 10*3/mm3      RBC 3.78 (L) 10*6/mm3      Hemoglobin 11.7 (L) g/dL      Hematocrit 35.1 (L) %      MCV 92.9 fL      MCH 31.0 pg      MCHC 33.3 g/dL      RDW 12.2 %      RDW-SD 42.1 fl      MPV 11.2 fL      Platelets 198 10*3/mm3      Neutrophil % 82.3 (H) %      Lymphocyte % 9.9 (L) %      Monocyte % 5.8 %      Eosinophil % 0.7 %      Basophil % 0.4 %      Neutrophils, Absolute 11.30 (H) 10*3/mm3      Lymphocytes, Absolute 1.36 10*3/mm3      Monocytes, Absolute 0.80 10*3/mm3      Eosinophils, Absolute 0.10 10*3/mm3      Basophils, Absolute 0.06 10*3/mm3         Imaging Results (last 24 hours)     Procedure Component Value Units Date/Time    XR Chest 1 View [[id number]] Collected:  05/05/18 1837     Updated:  05/05/18 2005    Narrative:       EXAMINATION:   XR CHEST 1 VW-  5/5/2018 6:37 PM CDT     HISTORY: Chest pain     Frontal upright radiograph of the chest 5/5/2018 6:25 PM CDT     COMPARISON: None.     FINDINGS:   Mild perihilar interstitial infiltrates noted.. The cardiac silhouettes  mildly enlarged. This is suspicious for minimal or early changes of  pulmonary vascular congestion.      The osseous structures and surrounding soft tissues demonstrate no acute  abnormality.       Impression:       1. Mild cardiomegaly subtle perihilar interstitial infiltrate suspicious  for early interstitial pulmonary vascular congestion.        This report was finalized on 05/05/2018 18:38 by Dr. Bala Hart MD.        I  have personally reviewed and interpreted the radiology studies and ECG obtained at time of admission.     Assessment / Plan     Assessment:   Hospital Problem List     Hyperkalemia      1.  Bradycardia probably due to beta blocker and hyperkalemia plan is to hold beta blocker will give IV fluids he has been given Isuprel and has stabilized now continue to monitor serial potassiums hold Aldactone hold potassium supplements hold ACE inhibitor's trend troponins consult cardiology anticoagulate with Lovenox he is on aspirin and Plavix already.  Patient has a history apparently of elevated troponins chronically per family I am not sure if his current troponin elevation is due to an acute event or washout from his previous event or a chronic elevation.  Defer further treatment for coronary vascular disease to cardiology maximize medical therapy currently.  We will also give Lasix as well as gentle hydration to flush the potassium out of his system.  2.  Type II diabetes Accu-Cheks sliding scale insulin check hemoglobin A1c    Patient seen prior to midnight         Code Status: Full     I discussed the patients findings and my recommendations with the patient and his wife is his healthcare power surrogate    Estimated length of stay 1-2 days    Carl Nazario MD   05/06/18   12:05 AM

## 2018-05-07 ENCOUNTER — APPOINTMENT (OUTPATIENT)
Dept: CARDIOLOGY | Facility: HOSPITAL | Age: 78
End: 2018-05-07
Attending: INTERNAL MEDICINE

## 2018-05-07 LAB
ANION GAP SERPL CALCULATED.3IONS-SCNC: 14 MMOL/L (ref 4–13)
BACTERIA UR QL AUTO: ABNORMAL /HPF
BH CV ECHO MEAS - AO MAX PG (FULL): 5.6 MMHG
BH CV ECHO MEAS - AO MAX PG: 9.1 MMHG
BH CV ECHO MEAS - AO MEAN PG (FULL): 4 MMHG
BH CV ECHO MEAS - AO MEAN PG: 6 MMHG
BH CV ECHO MEAS - AO ROOT AREA (BSA CORRECTED): 1.3
BH CV ECHO MEAS - AO ROOT AREA: 5.3 CM^2
BH CV ECHO MEAS - AO ROOT DIAM: 2.6 CM
BH CV ECHO MEAS - AO V2 MAX: 151 CM/SEC
BH CV ECHO MEAS - AO V2 MEAN: 113 CM/SEC
BH CV ECHO MEAS - AO V2 VTI: 26.5 CM
BH CV ECHO MEAS - AVA(I,A): 1.8 CM^2
BH CV ECHO MEAS - AVA(I,D): 1.8 CM^2
BH CV ECHO MEAS - AVA(V,A): 2.3 CM^2
BH CV ECHO MEAS - AVA(V,D): 2.3 CM^2
BH CV ECHO MEAS - BSA(HAYCOCK): 2.1 M^2
BH CV ECHO MEAS - BSA: 2.1 M^2
BH CV ECHO MEAS - BZI_BMI: 26.4 KILOGRAMS/M^2
BH CV ECHO MEAS - BZI_METRIC_HEIGHT: 180.3 CM
BH CV ECHO MEAS - BZI_METRIC_WEIGHT: 85.7 KG
BH CV ECHO MEAS - CONTRAST EF 4CH: 30.8 ML/M^2
BH CV ECHO MEAS - EDV(CUBED): 238.3 ML
BH CV ECHO MEAS - EDV(MOD-SP4): 260 ML
BH CV ECHO MEAS - EDV(TEICH): 194 ML
BH CV ECHO MEAS - EF(CUBED): 50.6 %
BH CV ECHO MEAS - EF(MOD-SP4): 30.8 %
BH CV ECHO MEAS - EF(TEICH): 41.8 %
BH CV ECHO MEAS - ESV(CUBED): 117.6 ML
BH CV ECHO MEAS - ESV(MOD-SP4): 180 ML
BH CV ECHO MEAS - ESV(TEICH): 112.8 ML
BH CV ECHO MEAS - FS: 21 %
BH CV ECHO MEAS - IVS/LVPW: 0.9
BH CV ECHO MEAS - IVSD: 0.9 CM
BH CV ECHO MEAS - LA DIMENSION: 4.7 CM
BH CV ECHO MEAS - LA/AO: 1.8
BH CV ECHO MEAS - LAT PEAK E' VEL: 6.5 CM/SEC
BH CV ECHO MEAS - LV DIASTOLIC VOL/BSA (35-75): 126.3 ML/M^2
BH CV ECHO MEAS - LV MASS(C)D: 244.5 GRAMS
BH CV ECHO MEAS - LV MASS(C)DI: 118.7 GRAMS/M^2
BH CV ECHO MEAS - LV MAX PG: 3.5 MMHG
BH CV ECHO MEAS - LV MEAN PG: 2 MMHG
BH CV ECHO MEAS - LV SYSTOLIC VOL/BSA (12-30): 87.4 ML/M^2
BH CV ECHO MEAS - LV V1 MAX: 93.2 CM/SEC
BH CV ECHO MEAS - LV V1 MEAN: 61.3 CM/SEC
BH CV ECHO MEAS - LV V1 VTI: 12.8 CM
BH CV ECHO MEAS - LVIDD: 6.2 CM
BH CV ECHO MEAS - LVIDS: 4.9 CM
BH CV ECHO MEAS - LVLD AP4: 9.1 CM
BH CV ECHO MEAS - LVLS AP4: 9.1 CM
BH CV ECHO MEAS - LVOT AREA (M): 3.8 CM^2
BH CV ECHO MEAS - LVOT AREA: 3.8 CM^2
BH CV ECHO MEAS - LVOT DIAM: 2.2 CM
BH CV ECHO MEAS - LVPWD: 1 CM
BH CV ECHO MEAS - MED PEAK E' VEL: 4.57 CM/SEC
BH CV ECHO MEAS - MR MAX PG: 89.9 MMHG
BH CV ECHO MEAS - MR MAX VEL: 474 CM/SEC
BH CV ECHO MEAS - MR MEAN PG: 54 MMHG
BH CV ECHO MEAS - MR MEAN VEL: 335 CM/SEC
BH CV ECHO MEAS - MR PISA RADIUS: 0.3 CM
BH CV ECHO MEAS - MR PISA: 0.57 CM^2
BH CV ECHO MEAS - MR VTI: 116 CM
BH CV ECHO MEAS - MV DEC TIME: 0.12 SEC
BH CV ECHO MEAS - MV E MAX VEL: 138 CM/SEC
BH CV ECHO MEAS - RAP SYSTOLE: 5 MMHG
BH CV ECHO MEAS - RVSP: 63.4 MMHG
BH CV ECHO MEAS - SI(AO): 68.3 ML/M^2
BH CV ECHO MEAS - SI(CUBED): 58.6 ML/M^2
BH CV ECHO MEAS - SI(LVOT): 23.6 ML/M^2
BH CV ECHO MEAS - SI(MOD-SP4): 38.9 ML/M^2
BH CV ECHO MEAS - SI(TEICH): 39.4 ML/M^2
BH CV ECHO MEAS - SV(AO): 140.7 ML
BH CV ECHO MEAS - SV(CUBED): 120.7 ML
BH CV ECHO MEAS - SV(LVOT): 48.7 ML
BH CV ECHO MEAS - SV(MOD-SP4): 80 ML
BH CV ECHO MEAS - SV(TEICH): 81.2 ML
BH CV ECHO MEAS - TR MAX VEL: 382 CM/SEC
BH CV ECHO MEASUREMENTS AVERAGE E/E' RATIO: 24.93
BILIRUB UR QL STRIP: NEGATIVE
BUN BLD-MCNC: 21 MG/DL (ref 5–21)
BUN/CREAT SERPL: 20 (ref 7–25)
CALCIUM SPEC-SCNC: 9.1 MG/DL (ref 8.4–10.4)
CHLORIDE SERPL-SCNC: 99 MMOL/L (ref 98–110)
CLARITY UR: CLEAR
CO2 SERPL-SCNC: 26 MMOL/L (ref 24–31)
COLOR UR: YELLOW
CREAT BLD-MCNC: 1.05 MG/DL (ref 0.5–1.4)
GFR SERPL CREATININE-BSD FRML MDRD: 69 ML/MIN/1.73
GLUCOSE BLD-MCNC: 256 MG/DL (ref 70–100)
GLUCOSE BLDC GLUCOMTR-MCNC: 118 MG/DL (ref 70–130)
GLUCOSE BLDC GLUCOMTR-MCNC: 255 MG/DL (ref 70–130)
GLUCOSE BLDC GLUCOMTR-MCNC: 278 MG/DL (ref 70–130)
GLUCOSE BLDC GLUCOMTR-MCNC: 282 MG/DL (ref 70–130)
GLUCOSE UR STRIP-MCNC: ABNORMAL MG/DL
HGB UR QL STRIP.AUTO: NEGATIVE
HYALINE CASTS UR QL AUTO: ABNORMAL /LPF
KETONES UR QL STRIP: NEGATIVE
LEFT ATRIUM VOLUME INDEX: 47 ML/M2
LEFT ATRIUM VOLUME: 96.9 CM3
LEUKOCYTE ESTERASE UR QL STRIP.AUTO: ABNORMAL
LV EF 2D ECHO EST: 30 %
MAXIMAL PREDICTED HEART RATE: 144 BPM
NITRITE UR QL STRIP: NEGATIVE
PH UR STRIP.AUTO: <=5 [PH] (ref 5–8)
POTASSIUM BLD-SCNC: 4.4 MMOL/L (ref 3.5–5.3)
PROT UR QL STRIP: ABNORMAL
RBC # UR: ABNORMAL /HPF
REF LAB TEST METHOD: ABNORMAL
SODIUM BLD-SCNC: 139 MMOL/L (ref 135–145)
SP GR UR STRIP: 1.02 (ref 1–1.03)
SQUAMOUS #/AREA URNS HPF: ABNORMAL /HPF
STRESS TARGET HR: 122 BPM
UROBILINOGEN UR QL STRIP: ABNORMAL
WBC UR QL AUTO: ABNORMAL /HPF

## 2018-05-07 PROCEDURE — 63710000001 INSULIN LISPRO (HUMAN) PER 5 UNITS: Performed by: INTERNAL MEDICINE

## 2018-05-07 PROCEDURE — 25010000002 ENOXAPARIN PER 10 MG: Performed by: INTERNAL MEDICINE

## 2018-05-07 PROCEDURE — 94799 UNLISTED PULMONARY SVC/PX: CPT

## 2018-05-07 PROCEDURE — 93306 TTE W/DOPPLER COMPLETE: CPT | Performed by: INTERNAL MEDICINE

## 2018-05-07 PROCEDURE — 94760 N-INVAS EAR/PLS OXIMETRY 1: CPT

## 2018-05-07 PROCEDURE — 0399T ADULT TRANSTHORACIC ECHO COMPLETE W/ CONT IF NECESSARY PER PROTOCOL: CPT | Performed by: INTERNAL MEDICINE

## 2018-05-07 PROCEDURE — 99232 SBSQ HOSP IP/OBS MODERATE 35: CPT | Performed by: INTERNAL MEDICINE

## 2018-05-07 PROCEDURE — 80048 BASIC METABOLIC PNL TOTAL CA: CPT | Performed by: INTERNAL MEDICINE

## 2018-05-07 PROCEDURE — 0399T HC MYOCARDL STRAIN IMAG QUAN ASSMT PER SESS: CPT

## 2018-05-07 PROCEDURE — 93306 TTE W/DOPPLER COMPLETE: CPT

## 2018-05-07 PROCEDURE — 81001 URINALYSIS AUTO W/SCOPE: CPT | Performed by: INTERNAL MEDICINE

## 2018-05-07 PROCEDURE — 25010000002 PERFLUTREN 6.52 MG/ML SUSPENSION: Performed by: FAMILY MEDICINE

## 2018-05-07 PROCEDURE — 63710000001 INSULIN LISPRO (HUMAN) PER 5 UNITS: Performed by: FAMILY MEDICINE

## 2018-05-07 PROCEDURE — 82962 GLUCOSE BLOOD TEST: CPT

## 2018-05-07 RX ORDER — HYDROXYZINE HYDROCHLORIDE 25 MG/1
25 TABLET, FILM COATED ORAL 3 TIMES DAILY PRN
Status: DISCONTINUED | OUTPATIENT
Start: 2018-05-07 | End: 2018-05-09 | Stop reason: HOSPADM

## 2018-05-07 RX ORDER — CARVEDILOL 3.12 MG/1
3.12 TABLET ORAL EVERY 12 HOURS SCHEDULED
Status: DISCONTINUED | OUTPATIENT
Start: 2018-05-07 | End: 2018-05-09 | Stop reason: HOSPADM

## 2018-05-07 RX ORDER — LISINOPRIL 2.5 MG/1
2.5 TABLET ORAL EVERY 12 HOURS SCHEDULED
Status: DISCONTINUED | OUTPATIENT
Start: 2018-05-07 | End: 2018-05-09 | Stop reason: HOSPADM

## 2018-05-07 RX ADMIN — ALBUTEROL SULFATE 2.5 MG: 2.5 SOLUTION RESPIRATORY (INHALATION) at 11:40

## 2018-05-07 RX ADMIN — ASPIRIN 81 MG: 81 TABLET ORAL at 08:45

## 2018-05-07 RX ADMIN — ALBUTEROL SULFATE 2.5 MG: 2.5 SOLUTION RESPIRATORY (INHALATION) at 06:36

## 2018-05-07 RX ADMIN — CARVEDILOL 3.12 MG: 3.12 TABLET, FILM COATED ORAL at 20:16

## 2018-05-07 RX ADMIN — ALBUTEROL SULFATE 2.5 MG: 2.5 SOLUTION RESPIRATORY (INHALATION) at 19:23

## 2018-05-07 RX ADMIN — PERFLUTREN 8.48 MG: 6.52 INJECTION, SUSPENSION INTRAVENOUS at 15:06

## 2018-05-07 RX ADMIN — INSULIN LISPRO 4 UNITS: 100 INJECTION, SOLUTION INTRAVENOUS; SUBCUTANEOUS at 08:44

## 2018-05-07 RX ADMIN — ENOXAPARIN SODIUM 40 MG: 40 INJECTION SUBCUTANEOUS at 08:51

## 2018-05-07 RX ADMIN — CLOPIDOGREL 75 MG: 75 TABLET, FILM COATED ORAL at 08:45

## 2018-05-07 RX ADMIN — ALBUTEROL SULFATE 2.5 MG: 2.5 SOLUTION RESPIRATORY (INHALATION) at 00:02

## 2018-05-07 RX ADMIN — POLYETHYLENE GLYCOL (3350) 17 G: 17 POWDER, FOR SOLUTION ORAL at 18:19

## 2018-05-07 RX ADMIN — LISINOPRIL 2.5 MG: 2.5 TABLET ORAL at 20:16

## 2018-05-07 RX ADMIN — INSULIN LISPRO 4 UNITS: 100 INJECTION, SOLUTION INTRAVENOUS; SUBCUTANEOUS at 12:58

## 2018-05-07 RX ADMIN — INSULIN LISPRO 6 UNITS: 100 INJECTION, SOLUTION INTRAVENOUS; SUBCUTANEOUS at 17:08

## 2018-05-07 NOTE — PLAN OF CARE
Problem: Fall Risk (Adult)  Goal: Identify Related Risk Factors and Signs and Symptoms  Outcome: Ongoing (interventions implemented as appropriate)    Goal: Absence of Fall  Outcome: Ongoing (interventions implemented as appropriate)      Problem: Patient Care Overview  Goal: Plan of Care Review  Outcome: Ongoing (interventions implemented as appropriate)   05/07/18 5527   Coping/Psychosocial   Plan of Care Reviewed With patient;spouse   Plan of Care Review   Progress improving   OTHER   Outcome Summary VSS, No C/O Pain or SOB, K+ level WNL, awaiting new Echo results and Dr. Whatley to reinitiate meds       Problem: Arrhythmia/Dysrhythmia (Symptomatic) (Adult)  Goal: Signs and Symptoms of Listed Potential Problems Will be Absent, Minimized or Managed (Arrhythmia/Dysrhythmia)  Outcome: Ongoing (interventions implemented as appropriate)

## 2018-05-07 NOTE — PLAN OF CARE
Problem: Fall Risk (Adult)  Goal: Identify Related Risk Factors and Signs and Symptoms  Outcome: Ongoing (interventions implemented as appropriate)    Goal: Absence of Fall  Outcome: Ongoing (interventions implemented as appropriate)      Problem: Patient Care Overview  Goal: Plan of Care Review  Outcome: Ongoing (interventions implemented as appropriate)   05/06/18 2100 05/07/18 0359   Coping/Psychosocial   Plan of Care Reviewed With patient --    Plan of Care Review   Progress --  improving   OTHER   Outcome Summary --  VSS. No c/o pain or soa. Potassium level at 4.3. Normal sinus on telemetry. Will continue to monitor and notify MD of changes.     Goal: Individualization and Mutuality  Outcome: Ongoing (interventions implemented as appropriate)      Problem: Arrhythmia/Dysrhythmia (Symptomatic) (Adult)  Goal: Signs and Symptoms of Listed Potential Problems Will be Absent, Minimized or Managed (Arrhythmia/Dysrhythmia)  Outcome: Ongoing (interventions implemented as appropriate)

## 2018-05-07 NOTE — PROGRESS NOTES
[facility] Medicine Services  INPATIENT PROGRESS NOTE    Length of Stay: 2  Date of Admission: 5/5/2018  Primary Care Physician: No Known Provider    Subjective   Chief Complaint: Feels much better today.  Just a little winded when walking at a faster pace.     HPI   No chest pain.  No nausea.  No diarrhea.  No unusual fatigue  Good appetites        Review of Systems     All pertinent negatives and positives are as above. All other systems have been reviewed and are negative unless otherwise stated.     Objective    Temp:  [97.6 °F (36.4 °C)-99.2 °F (37.3 °C)] 97.6 °F (36.4 °C)  Heart Rate:  [] 99  Resp:  [16-22] 16  BP: (107-133)/(57-74) 116/60  Physical Exam   Constitutional: He is oriented to person, place, and time. He appears well-developed and well-nourished.   Eyes: Conjunctivae and EOM are normal. Pupils are equal, round, and reactive to light.   Neck: Normal range of motion. Neck supple. No JVD present.   Cardiovascular: Normal rate, regular rhythm, normal heart sounds and intact distal pulses.    Pulmonary/Chest: Effort normal and breath sounds normal.   Abdominal: Soft. Bowel sounds are normal.   Musculoskeletal: Normal range of motion.   Neurological: He is alert and oriented to person, place, and time.   Skin: Skin is warm and dry.   Psychiatric: He has a normal mood and affect. His behavior is normal.   Nursing note and vitals reviewed.          Results Review:  I have reviewed the labs, radiology results, and diagnostic studies.    Laboratory Data:     Results from last 7 days  Lab Units 05/06/18  0016 05/05/18  1805   WBC 10*3/mm3 12.48* 13.75*   HEMOGLOBIN g/dL 11.2* 11.7*   HEMATOCRIT % 32.7* 35.1*   PLATELETS 10*3/mm3 184 198          Results from last 7 days  Lab Units 05/07/18  0840 05/06/18  2018 05/06/18  0804  05/05/18  1940   SODIUM mmol/L 139 136 138  < > 132*   POTASSIUM mmol/L 4.4 4.3 4.9  < > 7.7*   CHLORIDE mmol/L 99 101 99  < > 96*   CO2  mmol/L 26.0 23.0* 24.0  < > 18.0*   BUN mg/dL 21 22* 23*  < > 19   CREATININE mg/dL 1.05 1.11 1.19  < > 1.31   CALCIUM mg/dL 9.1 8.9 9.3  < > 8.4   BILIRUBIN mg/dL  --   --   --   --  1.2*   ALK PHOS U/L  --   --   --   --  45   ALT (SGPT) U/L  --   --   --   --  81*   AST (SGOT) U/L  --   --   --   --  98*   GLUCOSE mg/dL 256* 272* 282*  < > 320*   < > = values in this interval not displayed.    Culture Data:        Radiology Data:   Imaging Results (last 24 hours)     ** No results found for the last 24 hours. **          I have reviewed the patient current medications.     Assessment/Plan     Hospital Problem List     Hyperkalemia          Assessment    Bradycardia resolved  Recent hx of NSTEMI treated elsewhere  DM II  Poor control  Leukocytosis, mild    Plan    Awaiting echocardiogram, not yet obtained.  Continue sliding scale insulin  Diabetic ed   A1c 8.2  Bmp in AM      Discharge Planning: I expect the patient to be discharged to home in 1 days.    Bonny Madrid DO   05/07/18   1:18 PM

## 2018-05-07 NOTE — PROGRESS NOTES
[facility] -  Progress Note     LOS: 2 days   Patient Care Team:  No Known Provider as PCP - General  FRIDA Lin as Physician Assistant (Otolaryngology)  Oscar Yanes MD as Consulting Physician (Otolaryngology)    Chief Complaint: feeling better    Reason for consultation: bradycardia    Subjective     Interval History:   Pt reports he's feeling pretty well today. Has some exertional dyspnea and describes orthopnea in recent past (not today)      Review of Systems:   Review of Systems   Constitution: Positive for malaise/fatigue. Negative for weight gain.   Cardiovascular: Positive for dyspnea on exertion, leg swelling (mild BL) and orthopnea. Negative for chest pain, claudication, irregular heartbeat, near-syncope, palpitations, paroxysmal nocturnal dyspnea and syncope.   Respiratory: Negative for hemoptysis and shortness of breath.    Hematologic/Lymphatic: Negative for bleeding problem.   Musculoskeletal: Negative for myalgias.   Gastrointestinal: Negative for melena, nausea and vomiting.   Genitourinary: Negative for hematuria.   Neurological: Negative for focal weakness and light-headedness.   All other systems reviewed and are negative.       Objective     Vital Sign Min/Max for last 24 hours  Temp  Min: 97.6 °F (36.4 °C)  Max: 99.2 °F (37.3 °C)   BP  Min: 107/57  Max: 120/67   Pulse  Min: 88  Max: 108   Resp  Min: 16  Max: 22   SpO2  Min: 95 %  Max: 99 %   No Data Recorded   No Data Recorded     1    05/06/18  1347   Weight: 86 kg (189 lb 9.6 oz)         Intake/Output Summary (Last 24 hours) at 05/07/18 1603  Last data filed at 05/07/18 0900   Gross per 24 hour   Intake              840 ml   Output              475 ml   Net              365 ml         Physical Exam:  Physical Exam   Constitutional: He is oriented to person, place, and time. He appears well-developed and well-nourished.   HENT:   Head: Normocephalic and atraumatic.   Eyes: Conjunctivae and EOM are  normal. Pupils are equal, round, and reactive to light.   Neck: Normal range of motion. Neck supple. No JVD present.   Cardiovascular: Normal rate, regular rhythm, S1 normal, S2 normal, normal heart sounds, intact distal pulses and normal pulses.    No murmur heard.  Pulmonary/Chest: Effort normal and breath sounds normal. No respiratory distress.   Abdominal: Soft. Bowel sounds are normal. He exhibits no distension.   Musculoskeletal: He exhibits no edema or tenderness.   Neurological: He is alert and oriented to person, place, and time.   Skin: Skin is warm and dry.   Psychiatric: He has a normal mood and affect. Judgment normal.   Vitals reviewed.       Results Review:   Lab Results (last 72 hours)     Procedure Component Value Units Date/Time    POC Glucose Once [[id number]]  (Abnormal) Collected:  05/07/18 1137    Specimen:  Blood Updated:  05/07/18 1149     Glucose 278 (H) mg/dL      Comment: : [id number] Shopdeca ID: [id number]       Basic Metabolic Panel [[id number]]  (Abnormal) Collected:  05/07/18 0840    Specimen:  Blood Updated:  05/07/18 0923     Glucose 256 (H) mg/dL      BUN 21 mg/dL      Creatinine 1.05 mg/dL      Sodium 139 mmol/L      Potassium 4.4 mmol/L      Chloride 99 mmol/L      CO2 26.0 mmol/L      Calcium 9.1 mg/dL      eGFR Non African Amer 69 mL/min/1.73      BUN/Creatinine Ratio 20.0     Anion Gap 14.0 (H) mmol/L     Narrative:       The MDRD GFR formula is only valid for adults with stable renal function between ages 18 and 70.    POC Glucose Once [[id number]]  (Abnormal) Collected:  05/07/18 0746    Specimen:  Blood Updated:  05/07/18 0816     Glucose 255 (H) mg/dL      Comment: : [id number] Shopdeca ID: [id number]       Basic Metabolic Panel [[id number]]  (Abnormal) Collected:  05/06/18 2018    Specimen:  Blood Updated:  05/06/18 2048     Glucose 272 (H) mg/dL      BUN 22 (H) mg/dL      Creatinine 1.11 mg/dL      Sodium 136 mmol/L      Potassium 4.3 mmol/L       Chloride 101 mmol/L      CO2 23.0 (L) mmol/L      Calcium 8.9 mg/dL      eGFR Non African Amer 64 mL/min/1.73      BUN/Creatinine Ratio 19.8     Anion Gap 12.0 mmol/L     Narrative:       The MDRD GFR formula is only valid for adults with stable renal function between ages 18 and 70.    POC Glucose Once [[id number]]  (Abnormal) Collected:  05/06/18 2036    Specimen:  Blood Updated:  05/06/18 2047     Glucose 298 (H) mg/dL      Comment: : [id number] Yessica RamosleyMeter ID: [id number]       POC Glucose Once [[id number]]  (Abnormal) Collected:  05/06/18 1718    Specimen:  Blood Updated:  05/06/18 1731     Glucose 138 (H) mg/dL      Comment: : [id number] Kiesha KaleeMeter ID: [id number]       POC Glucose Once [[id number]]  (Abnormal) Collected:  05/06/18 1104    Specimen:  Blood Updated:  05/06/18 1119     Glucose 273 (H) mg/dL      Comment: : [id number] Cone RobertMeter ID: [id number]       Troponin [[id number]]  (Abnormal) Collected:  05/06/18 0804    Specimen:  Blood Updated:  05/06/18 0948     Troponin I 12.900 (C) ng/mL     Basic Metabolic Panel [[id number]]  (Abnormal) Collected:  05/06/18 0804    Specimen:  Blood Updated:  05/06/18 0851     Glucose 282 (H) mg/dL      BUN 23 (H) mg/dL      Creatinine 1.19 mg/dL      Sodium 138 mmol/L      Potassium 4.9 mmol/L      Chloride 99 mmol/L      CO2 24.0 mmol/L      Calcium 9.3 mg/dL      eGFR Non African Amer 59 (L) mL/min/1.73      BUN/Creatinine Ratio 19.3     Anion Gap 15.0 (H) mmol/L     Narrative:       The MDRD GFR formula is only valid for adults with stable renal function between ages 18 and 70.    POC Glucose Once [[id number]]  (Abnormal) Collected:  05/06/18 0630    Specimen:  Blood Updated:  05/06/18 0643     Glucose 307 (H) mg/dL      Comment: : [id number] Rui AutumnMeter ID: [id number]       CK Total & CKMB [[id number]]  (Normal) Collected:  05/06/18 0016    Specimen:  Blood Updated:  05/06/18 0129     CKMB 4.36 ng/mL      Creatine Kinase 161  U/L     Narrative:       CKMB Index not indicated    Myoglobin, Serum [[id number]]  (Abnormal) Collected:  05/06/18 0016    Specimen:  Blood Updated:  05/06/18 0122     Myoglobin 259.7 (H) ng/mL     Hemoglobin A1c [[id number]] Collected:  05/06/18 0016    Specimen:  Blood Updated:  05/06/18 0113     Hemoglobin A1C 8.2 %     Narrative:       Less than 6.0           Non-Diabetic Range  6.0-7.0                 ADA Therapeutic Target  Greater than 7.0        Action Suggested    Basic Metabolic Panel [[id number]]  (Abnormal) Collected:  05/06/18 0016    Specimen:  Blood Updated:  05/06/18 0046     Glucose 276 (H) mg/dL      BUN 23 (H) mg/dL      Creatinine 1.34 mg/dL      Sodium 135 mmol/L      Potassium 7.6 (C) mmol/L      Chloride 98 mmol/L      CO2 24.0 mmol/L      Calcium 9.4 mg/dL      eGFR Non African Amer 52 (L) mL/min/1.73      BUN/Creatinine Ratio 17.2     Anion Gap 13.0 mmol/L     Narrative:       The MDRD GFR formula is only valid for adults with stable renal function between ages 18 and 70.    Phosphorus [[id number]]  (Abnormal) Collected:  05/06/18 0016    Specimen:  Blood Updated:  05/06/18 0042     Phosphorus 1.9 (L) mg/dL     Magnesium [[id number]]  (Normal) Collected:  05/06/18 0016    Specimen:  Blood Updated:  05/06/18 0042     Magnesium 1.9 mg/dL     CBC Auto Differential [[id number]]  (Abnormal) Collected:  05/06/18 0016    Specimen:  Blood Updated:  05/06/18 0024     WBC 12.48 (H) 10*3/mm3      RBC 3.60 (L) 10*6/mm3      Hemoglobin 11.2 (L) g/dL      Hematocrit 32.7 (L) %      MCV 90.8 fL      MCH 31.1 pg      MCHC 34.3 g/dL      RDW 12.2 %      RDW-SD 40.5 fl      MPV 11.1 fL      Platelets 184 10*3/mm3      Neutrophil % 90.1 (H) %      Lymphocyte % 4.2 (L) %      Monocyte % 4.9 %      Eosinophil % 0.0 %      Basophil % 0.2 %      Immature Grans % 0.6 %      Neutrophils, Absolute 11.25 (H) 10*3/mm3      Lymphocytes, Absolute 0.52 (L) 10*3/mm3      Monocytes, Absolute 0.61 10*3/mm3      Eosinophils,  Absolute 0.00 10*3/mm3      Basophils, Absolute 0.03 10*3/mm3      Immature Grans, Absolute 0.07 (H) 10*3/mm3      nRBC 0.0 /100 WBC     Troponin [[id number]]  (Abnormal) Collected:  05/05/18 1940    Specimen:  Blood Updated:  05/05/18 2012     Troponin I 14.200 (C) ng/mL     Comprehensive Metabolic Panel [[id number]]  (Abnormal) Collected:  05/05/18 1940    Specimen:  Blood from Arm, Right Updated:  05/05/18 2007     Glucose 320 (H) mg/dL      BUN 19 mg/dL      Creatinine 1.31 mg/dL      Sodium 132 (L) mmol/L      Potassium 7.7 (C) mmol/L      Chloride 96 (L) mmol/L      CO2 18.0 (L) mmol/L      Calcium 8.4 mg/dL      Total Protein 6.9 g/dL      Albumin 3.70 g/dL      ALT (SGPT) 81 (H) U/L      AST (SGOT) 98 (H) U/L      Alkaline Phosphatase 45 U/L      Total Bilirubin 1.2 (H) mg/dL      eGFR Non African Amer 53 (L) mL/min/1.73      Globulin 3.2 gm/dL      A/G Ratio 1.2 g/dL      BUN/Creatinine Ratio 14.5     Anion Gap 18.0 (H) mmol/L     Narrative:       The MDRD GFR formula is only valid for adults with stable renal function between ages 18 and 70.    Blanco Draw [[id number]] Collected:  05/05/18 1805    Specimen:  Blood Updated:  05/05/18 1915    Narrative:       The following orders were created for panel order Blanco Draw.  Procedure                               Abnormality         Status                     ---------                               -----------         ------                     Light Blue Top[[id number]]                                   Final result               Green Top (Gel)[[id number]]                                  Final result               Lavender Top[[id number]]                                     Final result               Red Top[[id number]]                                          Final result                 Please view results for these tests on the individual orders.    Light Blue Top [[id number]] Collected:  05/05/18 1805    Specimen:  Blood from Arm, Left Updated:  05/05/18 1915      Extra Tube hold for add-on     Comment: Auto resulted       Green Top (Gel) [[id number]] Collected:  05/05/18 1805    Specimen:  Blood from Arm, Right Updated:  05/05/18 1915     Extra Tube Hold for add-ons.     Comment: Auto resulted.       Lavender Top [[id number]] Collected:  05/05/18 1805    Specimen:  Blood from Arm, Right Updated:  05/05/18 1915     Extra Tube hold for add-on     Comment: Auto resulted       Red Top [[id number]] Collected:  05/05/18 1805    Specimen:  Blood from Arm, Right Updated:  05/05/18 1915     Extra Tube Hold for add-ons.     Comment: Auto resulted.       Troponin [[id number]]  (Abnormal) Collected:  05/05/18 1805    Specimen:  Blood from Arm, Right Updated:  05/05/18 1900     Troponin I 14.800 (C) ng/mL     BNP [[id number]]  (Abnormal) Collected:  05/05/18 1805    Specimen:  Blood from Arm, Right Updated:  05/05/18 1844     proBNP 8,850.0 (H) pg/mL     CK [[id number]]  (Normal) Collected:  05/05/18 1805    Specimen:  Blood from Arm, Right Updated:  05/05/18 1833     Creatine Kinase 180 U/L     Magnesium [[id number]]  (Normal) Collected:  05/05/18 1805    Specimen:  Blood from Arm, Right Updated:  05/05/18 1833     Magnesium 1.9 mg/dL     CBC & Differential [[id number]] Collected:  05/05/18 1805    Specimen:  Blood Updated:  05/05/18 1826    Narrative:       The following orders were created for panel order CBC & Differential.  Procedure                               Abnormality         Status                     ---------                               -----------         ------                     Manual Differential[[id number]]                                                         CBC Auto Differential[[id number]]        Abnormal            Final result                 Please view results for these tests on the individual orders.    CBC Auto Differential [[id number]]  (Abnormal) Collected:  05/05/18 1805    Specimen:  Blood from Arm, Right Updated:  05/05/18 1826     WBC 13.75 (H) 10*3/mm3       RBC 3.78 (L) 10*6/mm3      Hemoglobin 11.7 (L) g/dL      Hematocrit 35.1 (L) %      MCV 92.9 fL      MCH 31.0 pg      MCHC 33.3 g/dL      RDW 12.2 %      RDW-SD 42.1 fl      MPV 11.2 fL      Platelets 198 10*3/mm3      Neutrophil % 82.3 (H) %      Lymphocyte % 9.9 (L) %      Monocyte % 5.8 %      Eosinophil % 0.7 %      Basophil % 0.4 %      Neutrophils, Absolute 11.30 (H) 10*3/mm3      Lymphocytes, Absolute 1.36 10*3/mm3      Monocytes, Absolute 0.80 10*3/mm3      Eosinophils, Absolute 0.10 10*3/mm3      Basophils, Absolute 0.06 10*3/mm3               Echo EF Estimated  No results found for: ECHOEFEST      Cath Ejection Fraction Quantitative  No results found for: CATHEF        Medication Review: yes  Current Facility-Administered Medications   Medication Dose Route Frequency Provider Last Rate Last Dose   • acetaminophen (TYLENOL) tablet 650 mg  650 mg Oral Q4H PRN Carl Nazario MD        Or   • acetaminophen (TYLENOL) suppository 650 mg  650 mg Rectal Q4H PRN Carl Nazario MD       • albuterol (PROVENTIL) nebulizer solution 0.083% 2.5 mg/3mL  2.5 mg Nebulization Q6H - RT Carl Nazario MD   2.5 mg at 05/07/18 1140   • aspirin EC tablet 81 mg  81 mg Oral Daily Carl Nazario MD   81 mg at 05/07/18 0845   • aspirin tablet 325 mg  325 mg Oral Once Judie PABLO MD       • clopidogrel (PLAVIX) tablet 75 mg  75 mg Oral Daily Carl Nazario MD   75 mg at 05/07/18 0845   • dextrose (D50W) solution 25 g  25 g Intravenous Q15 Min PRN Carl Nazario MD       • dextrose (GLUTOSE) oral gel 15 g  15 g Oral Q15 Min PRN Carl Nazario MD       • enoxaparin (LOVENOX) syringe 40 mg  40 mg Subcutaneous Q24H Carl Nazario MD   40 mg at 05/07/18 0851   • glucagon (human recombinant) (GLUCAGEN DIAGNOSTIC) injection 1 mg  1 mg Subcutaneous PRN Carl Nazario MD       • hydrALAZINE (APRESOLINE) injection 20 mg  20 mg Intravenous Q4H PRN Carl Nazario MD       • insulin lispro (humaLOG) injection  2-7 Units  2-7 Units Subcutaneous 4x Daily With Meals & Nightly Carl Nazario MD   4 Units at 05/07/18 1258   • nitroglycerin (NITROSTAT) SL tablet 0.4 mg  0.4 mg Sublingual Q5 Min PRN Carl Nazario MD       • ondansetron (ZOFRAN) injection 4 mg  4 mg Intravenous Q6H PRN Carl Nazario MD       • sodium chloride 0.9 % flush 1-10 mL  1-10 mL Intravenous PRN Carl Nazario MD       • sodium chloride 0.9 % flush 10 mL  10 mL Intravenous PRN Judie PABLO MD             Assessment/Plan   1. Sinus bradycardia: now resolved. Secondary to #2  2. Hyperkalemia: resolved  3. Elevated BNP: echo pending. No overt signs of decompensated heart failure on my exam  4. Diabetes mellitus type 2  5. Hypertension: with no high readings here despite holding anti-hypertensive therapy  6. Hyperlipidemia  7. Coronary artery disease      Plan   1. At this point he has remained without hypertension. I will hold off on adding back any of his anti-hypertensive meds for now. Certainly if echo shows LV dysfunction will consider adding Lisinopril back and other necessary heart failure meds.   2. Continue aspirin and plavix    Esme Bueno PA-C  05/07/18  4:03 PM

## 2018-05-07 NOTE — NURSING NOTE
Discharge Planning Assessment  [facility]     Patient Name: Nithin Horn  MRN: [id number]  Today's Date: 5/7/2018    Admit Date: 5/5/2018          Discharge Needs Assessment    No documentation.             Discharge Plan     Row Name 05/07/18 1408       Plan    Plan Lives with spouse. Independent and does not use DME. Denies needs at this time.  Plans is home and does not want home health.         Destination     No service coordination in this encounter.      Durable Medical Equipment     No service coordination in this encounter.      Dialysis/Infusion     No service coordination in this encounter.      Home Medical Care     No service coordination in this encounter.      Social Care     No service coordination in this encounter.                Demographic Summary    No documentation.           Functional Status    No documentation.           Psychosocial    No documentation.           Abuse/Neglect    No documentation.           Legal    No documentation.           Substance Abuse    No documentation.           Patient Forms    No documentation.         Merlina A Fletcher, RN

## 2018-05-08 LAB
ANION GAP SERPL CALCULATED.3IONS-SCNC: 13 MMOL/L (ref 4–13)
BUN BLD-MCNC: 22 MG/DL (ref 5–21)
BUN/CREAT SERPL: 22.9 (ref 7–25)
CALCIUM SPEC-SCNC: 9 MG/DL (ref 8.4–10.4)
CHLORIDE SERPL-SCNC: 103 MMOL/L (ref 98–110)
CO2 SERPL-SCNC: 24 MMOL/L (ref 24–31)
CREAT BLD-MCNC: 0.96 MG/DL (ref 0.5–1.4)
GFR SERPL CREATININE-BSD FRML MDRD: 76 ML/MIN/1.73
GLUCOSE BLD-MCNC: 214 MG/DL (ref 70–100)
GLUCOSE BLDC GLUCOMTR-MCNC: 189 MG/DL (ref 70–130)
GLUCOSE BLDC GLUCOMTR-MCNC: 212 MG/DL (ref 70–130)
GLUCOSE BLDC GLUCOMTR-MCNC: 236 MG/DL (ref 70–130)
GLUCOSE BLDC GLUCOMTR-MCNC: 263 MG/DL (ref 70–130)
GLUCOSE BLDC GLUCOMTR-MCNC: 294 MG/DL (ref 70–130)
POTASSIUM BLD-SCNC: 4.9 MMOL/L (ref 3.5–5.3)
SODIUM BLD-SCNC: 140 MMOL/L (ref 135–145)

## 2018-05-08 PROCEDURE — 94799 UNLISTED PULMONARY SVC/PX: CPT

## 2018-05-08 PROCEDURE — 99232 SBSQ HOSP IP/OBS MODERATE 35: CPT | Performed by: INTERNAL MEDICINE

## 2018-05-08 PROCEDURE — 94760 N-INVAS EAR/PLS OXIMETRY 1: CPT

## 2018-05-08 PROCEDURE — 82962 GLUCOSE BLOOD TEST: CPT

## 2018-05-08 PROCEDURE — 25010000002 ENOXAPARIN PER 10 MG: Performed by: INTERNAL MEDICINE

## 2018-05-08 PROCEDURE — 80048 BASIC METABOLIC PNL TOTAL CA: CPT | Performed by: FAMILY MEDICINE

## 2018-05-08 PROCEDURE — 25010000002 ONDANSETRON PER 1 MG: Performed by: INTERNAL MEDICINE

## 2018-05-08 PROCEDURE — 63710000001 INSULIN LISPRO (HUMAN) PER 5 UNITS: Performed by: FAMILY MEDICINE

## 2018-05-08 PROCEDURE — 94640 AIRWAY INHALATION TREATMENT: CPT

## 2018-05-08 RX ORDER — BISACODYL 5 MG/1
10 TABLET, DELAYED RELEASE ORAL DAILY PRN
Status: DISCONTINUED | OUTPATIENT
Start: 2018-05-08 | End: 2018-05-09 | Stop reason: HOSPADM

## 2018-05-08 RX ADMIN — LISINOPRIL 2.5 MG: 2.5 TABLET ORAL at 09:03

## 2018-05-08 RX ADMIN — LISINOPRIL 2.5 MG: 2.5 TABLET ORAL at 20:06

## 2018-05-08 RX ADMIN — POLYETHYLENE GLYCOL (3350) 17 G: 17 POWDER, FOR SOLUTION ORAL at 09:02

## 2018-05-08 RX ADMIN — ALBUTEROL SULFATE 2.5 MG: 2.5 SOLUTION RESPIRATORY (INHALATION) at 00:00

## 2018-05-08 RX ADMIN — INSULIN LISPRO 5 UNITS: 100 INJECTION, SOLUTION INTRAVENOUS; SUBCUTANEOUS at 20:19

## 2018-05-08 RX ADMIN — CARVEDILOL 3.12 MG: 3.12 TABLET, FILM COATED ORAL at 09:03

## 2018-05-08 RX ADMIN — ENOXAPARIN SODIUM 40 MG: 40 INJECTION SUBCUTANEOUS at 09:02

## 2018-05-08 RX ADMIN — ASPIRIN 81 MG: 81 TABLET ORAL at 09:03

## 2018-05-08 RX ADMIN — INSULIN LISPRO 6 UNITS: 100 INJECTION, SOLUTION INTRAVENOUS; SUBCUTANEOUS at 12:36

## 2018-05-08 RX ADMIN — ONDANSETRON 4 MG: 2 INJECTION, SOLUTION INTRAMUSCULAR; INTRAVENOUS at 05:57

## 2018-05-08 RX ADMIN — INSULIN LISPRO 2 UNITS: 100 INJECTION, SOLUTION INTRAVENOUS; SUBCUTANEOUS at 17:01

## 2018-05-08 RX ADMIN — ALBUTEROL SULFATE 2.5 MG: 2.5 SOLUTION RESPIRATORY (INHALATION) at 06:23

## 2018-05-08 RX ADMIN — ALBUTEROL SULFATE 2.5 MG: 2.5 SOLUTION RESPIRATORY (INHALATION) at 20:12

## 2018-05-08 RX ADMIN — INSULIN LISPRO 4 UNITS: 100 INJECTION, SOLUTION INTRAVENOUS; SUBCUTANEOUS at 09:02

## 2018-05-08 RX ADMIN — CLOPIDOGREL 75 MG: 75 TABLET, FILM COATED ORAL at 09:03

## 2018-05-08 RX ADMIN — ALBUTEROL SULFATE 2.5 MG: 2.5 SOLUTION RESPIRATORY (INHALATION) at 11:34

## 2018-05-08 RX ADMIN — CARVEDILOL 3.12 MG: 3.12 TABLET, FILM COATED ORAL at 20:06

## 2018-05-08 NOTE — PROGRESS NOTES
Referring Provider: Bonny Madrid DO    Length of Stay: 3    Chief Complaint:   Chief Complaint   Patient presents with   • Slow Heart Rate       Subjective: still constipated    Medications  Current Facility-Administered Medications   Medication Dose Route Frequency Provider Last Rate Last Dose   • acetaminophen (TYLENOL) tablet 650 mg  650 mg Oral Q4H PRN Carl Nazario MD        Or   • acetaminophen (TYLENOL) suppository 650 mg  650 mg Rectal Q4H PRN aCrl Nazario MD       • albuterol (PROVENTIL) nebulizer solution 0.083% 2.5 mg/3mL  2.5 mg Nebulization Q6H - RT Carl Nazario MD   2.5 mg at 05/08/18 0623   • aspirin EC tablet 81 mg  81 mg Oral Daily Carl Nazario MD   81 mg at 05/08/18 0903   • carvedilol (COREG) tablet 3.125 mg  3.125 mg Oral Q12H Hector Whatley MD   3.125 mg at 05/08/18 0903   • clopidogrel (PLAVIX) tablet 75 mg  75 mg Oral Daily Carl Nazario MD   75 mg at 05/08/18 0903   • dextrose (D50W) solution 25 g  25 g Intravenous Q15 Min PRN Carl Nazario MD       • dextrose (GLUTOSE) oral gel 15 g  15 g Oral Q15 Min PRN Carl Nazario MD       • enoxaparin (LOVENOX) syringe 40 mg  40 mg Subcutaneous Q24H Carl Nazario MD   40 mg at 05/08/18 0902   • glucagon (human recombinant) (GLUCAGEN DIAGNOSTIC) injection 1 mg  1 mg Subcutaneous PRN Carl Nazario MD       • hydrALAZINE (APRESOLINE) injection 20 mg  20 mg Intravenous Q4H PRN Carl Nazario MD       • hydrOXYzine (ATARAX) tablet 25 mg  25 mg Oral TID PRN Esme Bueno PA-C       • insulin lispro (humaLOG) injection 0-9 Units  0-9 Units Subcutaneous 4x Daily With Meals & Nightly Bonny Madrid DO   4 Units at 05/08/18 0902   • lisinopril (PRINIVIL,ZESTRIL) tablet 2.5 mg  2.5 mg Oral Q12H Hector Whatley MD   2.5 mg at 05/08/18 0903   • nitroglycerin (NITROSTAT) SL tablet 0.4 mg  0.4 mg Sublingual Q5 Min PRN Carl Nazario MD       • ondansetron (ZOFRAN) injection 4 mg  4 mg Intravenous Q6H PRN  Carl Nazario MD   4 mg at 05/08/18 0557   • polyethylene glycol 3350 powder (packet)  17 g Oral Daily Bonny Madrid DO   17 g at 05/08/18 0902   • sodium chloride 0.9 % flush 1-10 mL  1-10 mL Intravenous PRN Carl Nazario MD       • sodium chloride 0.9 % flush 10 mL  10 mL Intravenous PRN Judie PABLO MD           Past Medical History:   Diagnosis Date   • Bundle branch block, right    • CAD, multiple vessel    • CHF (congestive heart failure)    • Diabetes mellitus    • Dyspnea    • Hearing loss    • HLD (hyperlipidemia)    • NSTEMI (non-ST elevated myocardial infarction)    • Parkinsons    ,   Past Surgical History:   Procedure Laterality Date   • CATARACT EXTRACTION     • CORONARY STENT PLACEMENT     ,   History reviewed. No pertinent family history.,   Social History   Substance Use Topics   • Smoking status: Never Smoker   • Smokeless tobacco: Never Used   • Alcohol use Not on file   ,    Review of Systems  Review of Systems   Cardiovascular: Positive for dyspnea on exertion.   Respiratory: Positive for shortness of breath.    Gastrointestinal: Positive for constipation.       Objective     Physical Exam:  Patient Vitals for the past 24 hrs:   BP Temp Temp src Pulse Resp SpO2 Weight   05/08/18 0700 108/60 98.5 °F (36.9 °C) Temporal Art 88 17 98 % -   05/08/18 0628 - - - 82 17 96 % -   05/08/18 0623 - - - 81 16 98 % -   05/08/18 0355 104/56 98.3 °F (36.8 °C) Temporal Art 86 18 93 % -   05/08/18 0008 95/65 98 °F (36.7 °C) Temporal Art 83 20 98 % -   05/08/18 0000 - - - 84 16 96 % -   05/07/18 1934 105/59 98.9 °F (37.2 °C) Temporal Art 98 18 97 % 86.5 kg (190 lb 12.8 oz)   05/07/18 1927 - - - 99 - - -   05/07/18 1923 - - - 100 18 94 % -   05/07/18 1603 136/65 98.2 °F (36.8 °C) Temporal Art 112 18 96 % -   05/07/18 1145 - - - 99 16 - -   05/07/18 1140 - - - 99 18 - -   05/07/18 1138 116/60 97.6 °F (36.4 °C) Temporal Art 100 18 96 % -     Physical Exam   Constitutional: He is oriented to person,  place, and time. He appears well-nourished. No distress.   HENT:   Head: Normocephalic.   Eyes: No scleral icterus.   Neck: Normal range of motion. Neck supple.   Cardiovascular: Normal rate, regular rhythm and normal heart sounds.  Exam reveals no gallop and no friction rub.    No murmur heard.  Pulmonary/Chest: Effort normal and breath sounds normal. No respiratory distress. He has no wheezes. He has no rales.   Abdominal: Soft. Bowel sounds are normal. He exhibits no distension. There is no tenderness.   Musculoskeletal: He exhibits no edema.   Neurological: He is alert and oriented to person, place, and time.   Skin: Skin is warm and dry. He is not diaphoretic. No erythema.   Psychiatric: He has a normal mood and affect. His behavior is normal.       Results Review:   I reviewed the patient's new clinical results.  Lab Results (last 24 hours)     Procedure Component Value Units Date/Time    POC Glucose Once [[id number]]  (Abnormal) Collected:  05/08/18 0815    Specimen:  Blood Updated:  05/08/18 0850     Glucose 236 (H) mg/dL      Comment: : [id number] Hector DiazaMeter ID: [id number]       Basic Metabolic Panel [[id number]]  (Abnormal) Collected:  05/08/18 0701    Specimen:  Blood Updated:  05/08/18 0819     Glucose 214 (H) mg/dL      BUN 22 (H) mg/dL      Creatinine 0.96 mg/dL      Sodium 140 mmol/L      Potassium 4.9 mmol/L      Chloride 103 mmol/L      CO2 24.0 mmol/L      Calcium 9.0 mg/dL      eGFR Non African Amer 76 mL/min/1.73      BUN/Creatinine Ratio 22.9     Anion Gap 13.0 mmol/L     Narrative:       The MDRD GFR formula is only valid for adults with stable renal function between ages 18 and 70.    POC Glucose Once [[id number]]  (Abnormal) Collected:  05/08/18 0557    Specimen:  Blood Updated:  05/08/18 0614     Glucose 212 (H) mg/dL      Comment: : [id number] Bari BensonaMeter ID: [id number]       POC Glucose Once [[id number]]  (Normal) Collected:  05/07/18 2014    Specimen:  Blood Updated:   05/07/18 2045     Glucose 118 mg/dL      Comment: : [id number] [facility]: [id number]       POC Glucose Once [[id number]]  (Abnormal) Collected:  05/07/18 1604    Specimen:  Blood Updated:  05/07/18 1635     Glucose 282 (H) mg/dL      Comment: : [id number] Will LakenMYoutego ID: [id number]       Urinalysis With / Microscopic If Indicated - Urine, Clean Catch [[id number]]  (Abnormal) Collected:  05/07/18 1607    Specimen:  Urine from Urine, Clean Catch Updated:  05/07/18 1629     Color, UA Yellow     Appearance, UA Clear     pH, UA <=5.0     Specific Gravity, UA 1.025     Glucose, UA >=1000 mg/dL (3+) (A)     Ketones, UA Negative     Bilirubin, UA Negative     Blood, UA Negative     Protein, UA 30 mg/dL (1+) (A)     Leuk Esterase, UA Small (1+) (A)     Nitrite, UA Negative     Urobilinogen, UA 0.2 E.U./dL    Urinalysis, Microscopic Only - Urine, Clean Catch [[id number]]  (Abnormal) Collected:  05/07/18 1607    Specimen:  Urine from Urine, Clean Catch Updated:  05/07/18 1629     RBC, UA 3-5 (A) /HPF      WBC, UA 6-12 (A) /HPF      Bacteria, UA None Seen /HPF      Squamous Epithelial Cells, UA 0-2 /HPF      Hyaline Casts, UA None Seen /LPF      Methodology Automated Microscopy    POC Glucose Once [[id number]]  (Abnormal) Collected:  05/07/18 1137    Specimen:  Blood Updated:  05/07/18 1149     Glucose 278 (H) mg/dL      Comment: : [id number] Elmer LakenMYoutego ID: [id number]           Lab Results   Component Value Date    ECHOEFEST 30 05/07/2018     Imaging Results (last 24 hours)     ** No results found for the last 24 hours. **          I have reviewed telemetry which reveals SR    Assessment/Plan   Active Problems:    Hyperkalemia      New Problems that need treatment:  1. Hyperkalemia/bradycardia resolved. Will restart diuretic as out pt. Will need BMP in 1 week. Should be ready to d/c tomorrow  2. CHF, will order lifevest  3. Constipation, will add laxative  Old problems that are stable:        Medical Decision Making: Moderate Complexity

## 2018-05-08 NOTE — PLAN OF CARE
Problem: Fall Risk (Adult)  Goal: Identify Related Risk Factors and Signs and Symptoms  Outcome: Ongoing (interventions implemented as appropriate)   05/06/18 1618   Fall Risk (Adult)   Related Risk Factors (Fall Risk) age-related changes;inadequate lighting;slippery/uneven surfaces;environment unfamiliar   Signs and Symptoms (Fall Risk) presence of risk factors     Goal: Absence of Fall  Outcome: Ongoing (interventions implemented as appropriate)   05/08/18 0211   Fall Risk (Adult)   Absence of Fall achieves outcome       Problem: Patient Care Overview  Goal: Plan of Care Review  Outcome: Ongoing (interventions implemented as appropriate)   05/08/18 0211   Coping/Psychosocial   Plan of Care Reviewed With patient   Plan of Care Review   Progress improving   OTHER   Outcome Summary VSS, no c/o pain or SOA, started coreg and lisinopril with complications. echo with ef 30% with plans of pt going home with lifevest at [place]. Plavix ongoing. K+ WNL. Possibly home soon. Cont to monitor     Goal: Individualization and Mutuality  Outcome: Ongoing (interventions implemented as appropriate)    Goal: Discharge Needs Assessment  Outcome: Ongoing (interventions implemented as appropriate)      Problem: Arrhythmia/Dysrhythmia (Symptomatic) (Adult)  Goal: Signs and Symptoms of Listed Potential Problems Will be Absent, Minimized or Managed (Arrhythmia/Dysrhythmia)  Outcome: Outcome(s) achieved Date Met: 05/08/18 05/07/18 1507   Goal/Outcome Evaluation   Problems Assessed (Arrhythmia/Dysrhythmia) all   Problems Present (Dysrhythmia) none       Problem: Cardiac: Heart Failure (Adult)  Goal: Signs and Symptoms of Listed Potential Problems Will be Absent, Minimized or Managed (Cardiac: Heart Failure)  Outcome: Ongoing (interventions implemented as appropriate)   05/08/18 0211   Goal/Outcome Evaluation   Problems Assessed (Heart Failure) all   Problems Present (Heart Failure) cardiac pump dysfunction;situational  response;fluid/electrolyte imbalance

## 2018-05-08 NOTE — PROGRESS NOTES
[facility] Medicine Services  INPATIENT PROGRESS NOTE    Length of Stay: 3  Date of Admission: 5/5/2018  Primary Care Physician: No Known Provider    Subjective   Chief Complaint: Feels better.   No chest pain.   HPI   Has been up walking and tolerating well.  No pain, shortness of breath, nausea.      Review of Systems     All pertinent negatives and positives are as above. All other systems have been reviewed and are negative unless otherwise stated.     Objective    Temp:  [97.7 °F (36.5 °C)-98.9 °F (37.2 °C)] 97.7 °F (36.5 °C)  Heart Rate:  [] 80  Resp:  [16-20] 18  BP: ()/(56-65) 107/59  Physical Exam   Constitutional: He is oriented to person, place, and time. He appears well-developed and well-nourished.   Eyes: Conjunctivae and EOM are normal. Pupils are equal, round, and reactive to light.   Neck: Normal range of motion. Neck supple. No JVD present.   Cardiovascular: Normal rate, regular rhythm, normal heart sounds and intact distal pulses.    Pulmonary/Chest: Effort normal and breath sounds normal.   Abdominal: Soft. Bowel sounds are normal.   Musculoskeletal: Normal range of motion.   Neurological: He is alert and oriented to person, place, and time.   Skin: Skin is warm and dry.   Psychiatric: He has a normal mood and affect. His behavior is normal.           Results Review:  I have reviewed the labs, radiology results, and diagnostic studies.    Laboratory Data:     Results from last 7 days  Lab Units 05/06/18  0016 05/05/18  1805   WBC 10*3/mm3 12.48* 13.75*   HEMOGLOBIN g/dL 11.2* 11.7*   HEMATOCRIT % 32.7* 35.1*   PLATELETS 10*3/mm3 184 198          Results from last 7 days  Lab Units 05/08/18  0701 05/07/18  0840 05/06/18  2018  05/05/18  1940   SODIUM mmol/L 140 139 136  < > 132*   POTASSIUM mmol/L 4.9 4.4 4.3  < > 7.7*   CHLORIDE mmol/L 103 99 101  < > 96*   CO2 mmol/L 24.0 26.0 23.0*  < > 18.0*   BUN mg/dL 22* 21 22*  < > 19   CREATININE mg/dL 0.96  1.05 1.11  < > 1.31   CALCIUM mg/dL 9.0 9.1 8.9  < > 8.4   BILIRUBIN mg/dL  --   --   --   --  1.2*   ALK PHOS U/L  --   --   --   --  45   ALT (SGPT) U/L  --   --   --   --  81*   AST (SGOT) U/L  --   --   --   --  98*   GLUCOSE mg/dL 214* 256* 272*  < > 320*   < > = values in this interval not displayed.    Culture Data:        Radiology Data:   Imaging Results (last 24 hours)     ** No results found for the last 24 hours. **          I have reviewed the patient current medications.     Assessment/Plan     Hospital Problem List     Hyperkalemia          Assessment     Bradycardia resolved  Recent hx of NSTEMI treated elsewhere  DM II  Poor control  Leukocytosis, mild  Heart failure with EF of 30%    Plan    Home tomorrow with life vest.        Discharge Planning: I expect the patient to be discharged to home in 1 days.    Bonny Madrid DO   05/08/18   3:56 PM

## 2018-05-08 NOTE — PLAN OF CARE
Problem: Fall Risk (Adult)  Goal: Absence of Fall  Outcome: Ongoing (interventions implemented as appropriate)   05/08/18 1800   Fall Risk (Adult)   Absence of Fall achieves outcome       Problem: Patient Care Overview  Goal: Plan of Care Review   05/08/18 1800   Coping/Psychosocial   Plan of Care Reviewed With patient   Plan of Care Review   Progress improving   OTHER   Outcome Summary VSS. No c/o pain. patient for lifevest possibly tomorrow and then d/c. K+ remains WNL       Problem: Cardiac: Heart Failure (Adult)  Goal: Signs and Symptoms of Listed Potential Problems Will be Absent, Minimized or Managed (Cardiac: Heart Failure)  Outcome: Ongoing (interventions implemented as appropriate)   05/08/18 0211   Goal/Outcome Evaluation   Problems Assessed (Heart Failure) all   Problems Present (Heart Failure) cardiac pump dysfunction;situational response;fluid/electrolyte im   05/08/18 0211   Goal/Outcome Evaluation   Problems Assessed (Heart Failure) all   Problems Present (Heart Failure) cardiac pump dysfunction;situational response;fluid/electrolyte imbalance   balance

## 2018-05-09 VITALS
DIASTOLIC BLOOD PRESSURE: 59 MMHG | SYSTOLIC BLOOD PRESSURE: 105 MMHG | OXYGEN SATURATION: 97 % | HEART RATE: 86 BPM | BODY MASS INDEX: 26.82 KG/M2 | TEMPERATURE: 98.7 F | WEIGHT: 191.6 LBS | HEIGHT: 71 IN | RESPIRATION RATE: 18 BRPM

## 2018-05-09 LAB
GLUCOSE BLDC GLUCOMTR-MCNC: 226 MG/DL (ref 70–130)
GLUCOSE BLDC GLUCOMTR-MCNC: 299 MG/DL (ref 70–130)
GLUCOSE BLDC GLUCOMTR-MCNC: 327 MG/DL (ref 70–130)

## 2018-05-09 PROCEDURE — 94799 UNLISTED PULMONARY SVC/PX: CPT

## 2018-05-09 PROCEDURE — 25010000002 ENOXAPARIN PER 10 MG: Performed by: INTERNAL MEDICINE

## 2018-05-09 PROCEDURE — 63710000001 INSULIN LISPRO (HUMAN) PER 5 UNITS: Performed by: FAMILY MEDICINE

## 2018-05-09 PROCEDURE — 94760 N-INVAS EAR/PLS OXIMETRY 1: CPT

## 2018-05-09 PROCEDURE — 82962 GLUCOSE BLOOD TEST: CPT

## 2018-05-09 RX ORDER — BISACODYL 5 MG/1
10 TABLET, DELAYED RELEASE ORAL DAILY PRN
Qty: 60 TABLET | Refills: 0 | Status: SHIPPED | OUTPATIENT
Start: 2018-05-09 | End: 2018-05-29

## 2018-05-09 RX ORDER — CARVEDILOL 3.12 MG/1
3.12 TABLET ORAL EVERY 12 HOURS SCHEDULED
Qty: 60 TABLET | Refills: 0 | Status: SHIPPED | OUTPATIENT
Start: 2018-05-09 | End: 2018-05-29 | Stop reason: SINTOL

## 2018-05-09 RX ADMIN — ASPIRIN 81 MG: 81 TABLET ORAL at 11:36

## 2018-05-09 RX ADMIN — INSULIN LISPRO 6 UNITS: 100 INJECTION, SOLUTION INTRAVENOUS; SUBCUTANEOUS at 11:36

## 2018-05-09 RX ADMIN — ALBUTEROL SULFATE 2.5 MG: 2.5 SOLUTION RESPIRATORY (INHALATION) at 13:31

## 2018-05-09 RX ADMIN — CLOPIDOGREL 75 MG: 75 TABLET, FILM COATED ORAL at 11:36

## 2018-05-09 RX ADMIN — MAGNESIUM HYDROXIDE 10 ML: 2400 SUSPENSION ORAL at 11:36

## 2018-05-09 RX ADMIN — ALBUTEROL SULFATE 2.5 MG: 2.5 SOLUTION RESPIRATORY (INHALATION) at 07:37

## 2018-05-09 RX ADMIN — POLYETHYLENE GLYCOL (3350) 17 G: 17 POWDER, FOR SOLUTION ORAL at 11:36

## 2018-05-09 RX ADMIN — ENOXAPARIN SODIUM 40 MG: 40 INJECTION SUBCUTANEOUS at 11:35

## 2018-05-09 RX ADMIN — ALBUTEROL SULFATE 2.5 MG: 2.5 SOLUTION RESPIRATORY (INHALATION) at 00:00

## 2018-05-09 RX ADMIN — BISACODYL 10 MG: 5 TABLET ORAL at 14:45

## 2018-05-09 RX ADMIN — LISINOPRIL 2.5 MG: 2.5 TABLET ORAL at 11:36

## 2018-05-09 RX ADMIN — CARVEDILOL 3.12 MG: 3.12 TABLET, FILM COATED ORAL at 11:35

## 2018-05-09 NOTE — PROGRESS NOTES
Spoke with patient about a transition visit from [facility] and he wants his wife to talk with me about the visit. Provided my contact information to the patient. Will await call from spouse. Mariola Grande RN, [facility]

## 2018-05-09 NOTE — DISCHARGE SUMMARY
"    [facility] Medicine Services  DISCHARGE SUMMARY       Date of Admission: 5/5/2018  Date of Discharge:  5/9/2018  Primary Care Physician: No Known Provider    Presenting Problem/History of Present Illness:  Hyperkalemia [E87.5]     Final Discharge Diagnoses:  Hospital Problem List     Hyperkalemia          Consults: Dr Whatley    Procedures Performed: none    Pertinent Test Results:   Echo   · The left ventricular cavity is mild-to-moderately dilated.  · Estimated EF = 30%.  · Right ventricular cavity is borderline dilated.  · Left atrial cavity size is mild-to-moderately dilated.  · Moderate mitral valve regurgitation is present       Chief Complaint on Day of Discharge: Feels fine but they're feeding him all wrong here.  Too much sweets.       History of Present Illness on Day of Discharge: No chest pain or shortness of air.     Hospital Course:  The patient is a 77 y.o. male who presented to [facility] with weakness and bradycardia.  He was admitted.  Medications adjusted and the heart rate improved.  He was hyperkalemic initially.  This was treated urgently and his potassium supplement discontinued.   He transitioned from unit to the floor.  He had a repeat echocardiogram.  He continued to improve.  A life vest was ordered for him at discharge.  Per patient he plans to transfer his cardiac car here.       Condition on Discharge:  Improved stable    Physical Exam on Discharge:  /57 (BP Location: Right arm, Patient Position: Sitting)   Pulse 80   Temp 98.8 °F (37.1 °C) (Temporal Artery )   Resp 18   Ht 180.3 cm (71\")   Wt 86.9 kg (191 lb 9.6 oz)   SpO2 96%   BMI 26.72 kg/m²   Physical Exam   Constitutional: He is oriented to person, place, and time. He appears well-developed and well-nourished.   Eyes: Conjunctivae and EOM are normal. Pupils are equal, round, and reactive to light.   Neck: Normal range of motion. Neck supple. No JVD present. "   Cardiovascular: Normal rate, regular rhythm, normal heart sounds and intact distal pulses.    Pulmonary/Chest: Effort normal and breath sounds normal.   Abdominal: Soft. Bowel sounds are normal.   Musculoskeletal: Normal range of motion.   Neurological: He is alert and oriented to person, place, and time.   Skin: Skin is warm and dry.   Psychiatric: He has a normal mood and affect. His behavior is normal.   Nursing note and vitals reviewed.        Discharge Disposition:  Home or Self Care    Discharge Medications:   Mercedes Horne   Home Medication Instructions [facility]:[id number]    Printed on:05/09/18 1275   Medication Information                      aspirin 81 MG EC tablet  Take 81 mg by mouth Daily.             atorvastatin (LIPITOR) 40 MG tablet  Take 40 mg by mouth Daily.             benazepril (LOTENSIN) 10 MG tablet  Take 10 mg by mouth Daily.             bisacodyl (DULCOLAX) 5 MG EC tablet  Take 2 tablets by mouth Daily As Needed for Constipation.             carvedilol (COREG) 3.125 MG tablet  Take 1 tablet by mouth Every 12 (Twelve) Hours.             clopidogrel (PLAVIX) 75 MG tablet  Take 75 mg by mouth Daily.             glyBURIDE (DIABETA) 1.25 MG tablet  Take 5 mg by mouth Daily With Breakfast.             lisinopril (PRINIVIL,ZESTRIL) 10 MG tablet  Take 10 mg by mouth 2 (Two) Times a Day.             nitroglycerin (NITROSTAT) 0.4 MG SL tablet  Place 0.4 mg under the tongue Every 5 (Five) Minutes As Needed for Chest Pain. Take no more than 3 doses in 15 minutes.             spironolactone (ALDACTONE) 25 MG tablet  Take 25 mg by mouth Daily.                 Discharge Diet:   Diet Instructions     Diet: Regular, Consistent Carbohydrate, Cardiac       Discharge Diet:   Regular  Consistent Carbohydrate  Cardiac             Activity at Discharge:   Activity Instructions     Activity as Tolerated             Discharge Care Plan/Instructions:   Weigh daily  Call if weight gain   (cardiology)    Follow-up  Appointments:   One week PCP  One week cardiology    Test Results Pending at Discharge: none    Bonny Madrid DO  05/09/18  1:15 PM    Time: 35 minutes

## 2018-05-09 NOTE — NURSING NOTE
Discharge Planning Assessment  [facility]     Patient Name: Nithin Horn  MRN: [id number]  Today's Date: 5/9/2018    Admit Date: 5/5/2018          Discharge Needs Assessment    No documentation.             Discharge Plan     Row Name 05/09/18 1439       Plan    Plan Referral to [facility] for transition visit. Mariola notified and will follow.        Destination     No service coordination in this encounter.      Durable Medical Equipment     No service coordination in this encounter.      Dialysis/Infusion     No service coordination in this encounter.      Home Medical Care     No service coordination in this encounter.      Social Care     No service coordination in this encounter.        Expected Discharge Date and Time     Expected Discharge Date Expected Discharge Time    May 9, 2018               Demographic Summary    No documentation.           Functional Status    No documentation.           Psychosocial    No documentation.           Abuse/Neglect    No documentation.           Legal    No documentation.           Substance Abuse    No documentation.           Patient Forms    No documentation.         Merlina A Fletcher, RN

## 2018-05-09 NOTE — PLAN OF CARE
Problem: Fall Risk (Adult)  Goal: Identify Related Risk Factors and Signs and Symptoms  Outcome: Ongoing (interventions implemented as appropriate)   05/06/18 1618   Fall Risk (Adult)   Related Risk Factors (Fall Risk) age-related changes;inadequate lighting;slippery/uneven surfaces;environment unfamiliar   Signs and Symptoms (Fall Risk) presence of risk factors     Goal: Absence of Fall  Outcome: Ongoing (interventions implemented as appropriate)      Problem: Patient Care Overview  Goal: Plan of Care Review  Outcome: Ongoing (interventions implemented as appropriate)   05/09/18 0211   Coping/Psychosocial   Plan of Care Reviewed With patient   Plan of Care Review   Progress improving   OTHER   Outcome Summary VSS, no c/o pain. Pt home soon with lifevest, possibly today. Cont to monitor     Goal: Individualization and Mutuality  Outcome: Ongoing (interventions implemented as appropriate)    Goal: Discharge Needs Assessment  Outcome: Ongoing (interventions implemented as appropriate)      Problem: Cardiac: Heart Failure (Adult)  Goal: Signs and Symptoms of Listed Potential Problems Will be Absent, Minimized or Managed (Cardiac: Heart Failure)  Outcome: Ongoing (interventions implemented as appropriate)   05/08/18 0211   Goal/Outcome Evaluation   Problems Assessed (Heart Failure) all   Problems Present (Heart Failure) cardiac pump dysfunction;situational response;fluid/electrolyte imbalance

## 2018-05-11 ENCOUNTER — TELEPHONE (OUTPATIENT)
Dept: CARDIOLOGY | Facility: CLINIC | Age: 78
End: 2018-05-11

## 2018-05-11 NOTE — TELEPHONE ENCOUNTER
Pts wife called said pt just got released from hospital 2 days ago and he is having some issues with the medication that was given to him at discharge.  I tried to call pt back and there was no answer.  Maurilio March, CMA

## 2018-05-29 ENCOUNTER — OFFICE VISIT (OUTPATIENT)
Dept: CARDIOLOGY | Facility: CLINIC | Age: 78
End: 2018-05-29

## 2018-05-29 VITALS
HEART RATE: 83 BPM | SYSTOLIC BLOOD PRESSURE: 128 MMHG | HEIGHT: 71 IN | OXYGEN SATURATION: 100 % | WEIGHT: 185 LBS | BODY MASS INDEX: 25.9 KG/M2 | DIASTOLIC BLOOD PRESSURE: 72 MMHG

## 2018-05-29 DIAGNOSIS — E78.2 MIXED HYPERLIPIDEMIA: ICD-10-CM

## 2018-05-29 DIAGNOSIS — I10 ESSENTIAL HYPERTENSION: ICD-10-CM

## 2018-05-29 DIAGNOSIS — I50.22 CHRONIC SYSTOLIC CHF (CONGESTIVE HEART FAILURE), NYHA CLASS 3 (HCC): ICD-10-CM

## 2018-05-29 DIAGNOSIS — Z91.199 MEDICAL NON-COMPLIANCE: ICD-10-CM

## 2018-05-29 DIAGNOSIS — I25.10 CORONARY ARTERY DISEASE INVOLVING NATIVE CORONARY ARTERY OF NATIVE HEART WITHOUT ANGINA PECTORIS: Primary | ICD-10-CM

## 2018-05-29 DIAGNOSIS — I25.5 ISCHEMIC CARDIOMYOPATHY: ICD-10-CM

## 2018-05-29 DIAGNOSIS — E11.59 TYPE 2 DIABETES MELLITUS WITH OTHER CIRCULATORY COMPLICATION, WITHOUT LONG-TERM CURRENT USE OF INSULIN (HCC): ICD-10-CM

## 2018-05-29 PROCEDURE — 93000 ELECTROCARDIOGRAM COMPLETE: CPT | Performed by: NURSE PRACTITIONER

## 2018-05-29 PROCEDURE — 99214 OFFICE O/P EST MOD 30 MIN: CPT | Performed by: NURSE PRACTITIONER

## 2018-05-29 RX ORDER — METOPROLOL SUCCINATE 25 MG/1
25 TABLET, EXTENDED RELEASE ORAL DAILY
Qty: 30 TABLET | Refills: 11 | Status: ON HOLD | OUTPATIENT
Start: 2018-05-29 | End: 2018-06-18

## 2018-05-29 NOTE — PROGRESS NOTES
"    Subjective:     Encounter Date:05/29/2018      Patient ID: Nithin Horn is a 77 y.o. male.    Chief Complaint:  The patient reports he is feeling better overall since his recent discharge from the hospital following a stay for hyperkalemia and associated bradycardia. He admits some shortness of breath with exertion, but he remains active. He denies chest pain, edema, weight gain, orthopnea, PND, palpitations, syncope or pre syncope. He reports he quit taking his coreg because it made him feel as though \"someone was hitting me in the head with a jack hammer.\" He refuses to take statins due to myalgias. He reports he has tried multiple statins, including crestor and lipitor.     Of note, the patient was discharged by the hospitalist on aldactone, lisinopril, and benazepril. The patient reports the lisinopril was stopped by his PCP and he took himself off of aldactone because it was \"making me swell.\"         The following portions of the patient's history were reviewed and updated as appropriate: allergies, current medications, past family history, past medical history, past social history, past surgical history and problem list.  /72   Pulse 83   Ht 180.3 cm (71\")   Wt 83.9 kg (185 lb)   SpO2 100%   BMI 25.80 kg/m²   Allergies   Allergen Reactions   • Demerol [Meperidine] Delirium       Current Outpatient Prescriptions:   •  aspirin 81 MG EC tablet, Take 81 mg by mouth Daily., Disp: , Rfl:   •  benazepril (LOTENSIN) 10 MG tablet, Take 10 mg by mouth Daily., Disp: , Rfl:   •  clopidogrel (PLAVIX) 75 MG tablet, Take 75 mg by mouth Daily., Disp: , Rfl:   •  glyBURIDE (DIABETA) 1.25 MG tablet, Take 5 mg by mouth Daily With Breakfast., Disp: , Rfl:   •  nitroglycerin (NITROSTAT) 0.4 MG SL tablet, Place 0.4 mg under the tongue Every 5 (Five) Minutes As Needed for Chest Pain. Take no more than 3 doses in 15 minutes., Disp: , Rfl:   •  metoprolol succinate XL (TOPROL-XL) 25 MG 24 hr tablet, Take 1 tablet by " mouth Daily., Disp: 30 tablet, Rfl: 11  Past Medical History:   Diagnosis Date   • Bundle branch block, right    • CAD, multiple vessel    • CHF (congestive heart failure)    • Diabetes mellitus    • Dyspnea    • Hearing loss    • HLD (hyperlipidemia)    • NSTEMI (non-ST elevated myocardial infarction)    • Parkinsons      Past Surgical History:   Procedure Laterality Date   • CATARACT EXTRACTION     • CORONARY STENT PLACEMENT       Social History     Social History   • Marital status:      Spouse name: N/A   • Number of children: N/A   • Years of education: N/A     Occupational History   • Not on file.     Social History Main Topics   • Smoking status: Never Smoker   • Smokeless tobacco: Never Used   • Alcohol use No   • Drug use: No   • Sexual activity: Defer     Other Topics Concern   • Not on file     Social History Narrative   • No narrative on file     History reviewed. No pertinent family history.    Review of Systems   Constitution: Positive for malaise/fatigue. Negative for chills, diaphoresis, fever and weakness.   HENT: Negative for nosebleeds.    Eyes: Negative for visual disturbance.   Cardiovascular: Positive for dyspnea on exertion. Negative for chest pain, claudication, cyanosis, irregular heartbeat, leg swelling, near-syncope, orthopnea, palpitations, paroxysmal nocturnal dyspnea and syncope.   Respiratory: Negative for cough, hemoptysis, shortness of breath, sputum production and wheezing.    Hematologic/Lymphatic: Negative for bleeding problem.   Skin: Negative for color change and flushing.   Musculoskeletal: Negative for falls.   Gastrointestinal: Negative for bloating, abdominal pain, hematemesis, hematochezia, melena, nausea and vomiting.   Genitourinary: Negative for hematuria.   Neurological: Negative for dizziness and light-headedness.   Psychiatric/Behavioral: Negative for altered mental status.         ECG 12 Lead  Date/Time: 5/29/2018 4:41 PM  Performed by: MONICO SALEH  GEORGE  Authorized by: MONICO SALEH   Comparison: compared with previous ECG from 5/6/2018  Similar to previous ECG  Rhythm: sinus rhythm  Conduction: complete RBBB               Objective:     Physical Exam   Constitutional: He is oriented to person, place, and time. He appears well-developed and well-nourished. No distress.   HENT:   Head: Normocephalic and atraumatic.   Eyes: Pupils are equal, round, and reactive to light.   Neck: Normal range of motion. Neck supple. No JVD present. No thyromegaly present.   Cardiovascular: Normal rate, regular rhythm, normal heart sounds and intact distal pulses.  Exam reveals no gallop and no friction rub.    No murmur heard.  Pulmonary/Chest: Effort normal and breath sounds normal. No respiratory distress. He has no wheezes. He has no rales. He exhibits no tenderness.   Abdominal: Soft. Bowel sounds are normal. He exhibits no distension. There is no tenderness.   Musculoskeletal: Normal range of motion. He exhibits no edema.   Neurological: He is alert and oriented to person, place, and time. No cranial nerve deficit.   Skin: Skin is warm and dry. He is not diaphoretic.   Psychiatric: He has a normal mood and affect. His behavior is normal.       Lab Review:       Assessment:          Diagnosis Plan   1. Coronary artery disease involving native coronary artery of native heart without angina pectoris  Stable.  No angina  PCI to LAD, OM3 and OM2 at [facility] on 4/15/18   2. Ischemic cardiomyopathy     3. Chronic systolic CHF (congestive heart failure), NYHA class 3  Stage C. LVEF 30%, moderate MR, borderline RV dilation- repeat echo 90 days from previous around 8/7/18   Compensated  Educated on the risk of sudden cardiac death associated with his dilated ischemic cardiomyopathy and recommended he wear his LifeVest (he has not been wearing it). He voices understanding.    4. Essential hypertension    Controlled    5. Mixed hyperlipidemia    See HPI. Refuses statins. Obtain copy of  lipid panel. May be candidate for PCSK9 inhibitor     6. Type 2 diabetes mellitus with other circulatory complication, without long-term current use of insulin  Followed by PCP    7. Medical non-compliance  Counseled on the importance of compliance with recommended medical therapy      Recent hospital admission for hyperkalemia and associated bradycardia while on kdur, aldactone, and lisnopril. He is no longer on Kdur and aldactone. Bradycardia and hyperkalemia resolved at the time of discharge.      Plan:       As noted above   Continue ASA, plavix, benazepril.  Add low dose toprol xl, as he quit taking coreg due to intolerance.  Repeat echo 90 days from previous to determine his qualification for an AICD  Wear LifeVest  Obtain lipid panel. Consider PCSK9 inhibitor.   Obtain cath report, echo reports from [place]  Follow up 4 weeks     Reviewed signs and symptoms of CHF and what to report with the patient. Patient instructed to restrict sodium and weigh daily. Report weight gain of greater than 2 lbs overnight or 5 lbs in 1 week. Pt verbalized understanding of instructions and plan of care.

## 2018-05-30 ENCOUNTER — TELEPHONE (OUTPATIENT)
Dept: CARDIOLOGY | Facility: CLINIC | Age: 78
End: 2018-05-30

## 2018-05-30 NOTE — TELEPHONE ENCOUNTER
I called to ask a simple question to who his PCP was and his wife would not let me speak to him but after about 15 minutes of questioning me about why I needed to know she gave me the information. I asked where his last Lipid panel would have been done at she said we should have it. I explained the reason I was calling was for Jayla our NP to know. She explained [facility] should have it as much blood the ER had done on him. I explained to her the different labs that was done but none for his cholesterol.She did not sound very happy I was trying to be nice but you could tell she was aggravated we did not have a cholesterol reading on him. I told her we were going to get in touch with [facility] and his PCP for a most recent lipid.

## 2018-05-30 NOTE — TELEPHONE ENCOUNTER
----- Message from VERONA Epperson sent at 5/29/2018  5:07 PM CDT -----  Please obtain most recent lipid panel (either from [facility] or PCP). Please obtain 2d echo and cath reports from [facility] in April. We have a discharge summary and cath notes in [facility] but not the actual cath report or echo. Thanks.

## 2018-06-11 ENCOUNTER — TELEPHONE (OUTPATIENT)
Dept: CARDIOLOGY | Facility: CLINIC | Age: 78
End: 2018-06-11

## 2018-06-11 NOTE — TELEPHONE ENCOUNTER
----- Message from VERONA Epperson sent at 6/6/2018  2:38 PM CDT -----  The patient's LDL in April of this year was 165, which is very elevated. He needs to be on cholesterol medication because of his CAD. He refuses to take statins. Therefore, I recommend a PCSK9 inhibitor or Repatha- this is an injectable cholesterol medication- we discussed this during his office visit. Let me know if he is agreeable and I will order it. Thanks.

## 2018-06-11 NOTE — TELEPHONE ENCOUNTER
I talked to his wife about the medication and she will talk to him and get back with us on what they decide. I also faxed her his lab results from [facility] she wanted.

## 2018-06-17 ENCOUNTER — APPOINTMENT (OUTPATIENT)
Dept: GENERAL RADIOLOGY | Facility: HOSPITAL | Age: 78
End: 2018-06-17

## 2018-06-17 ENCOUNTER — HOSPITAL ENCOUNTER (INPATIENT)
Facility: HOSPITAL | Age: 78
LOS: 1 days | Discharge: HOME | End: 2018-06-18
Attending: EMERGENCY MEDICINE | Admitting: FAMILY MEDICINE

## 2018-06-17 ENCOUNTER — APPOINTMENT (OUTPATIENT)
Dept: CT IMAGING | Facility: HOSPITAL | Age: 78
End: 2018-06-17

## 2018-06-17 DIAGNOSIS — I50.9 ACUTE ON CHRONIC CONGESTIVE HEART FAILURE, UNSPECIFIED CONGESTIVE HEART FAILURE TYPE: Primary | ICD-10-CM

## 2018-06-17 LAB
ALBUMIN SERPL-MCNC: 4.1 G/DL (ref 3.5–5)
ALBUMIN/GLOB SERPL: 1.1 G/DL (ref 1.1–2.5)
ALP SERPL-CCNC: 46 U/L (ref 24–120)
ALT SERPL W P-5'-P-CCNC: 22 U/L (ref 0–54)
ANION GAP SERPL CALCULATED.3IONS-SCNC: 13 MMOL/L (ref 4–13)
AST SERPL-CCNC: 32 U/L (ref 7–45)
BASOPHILS # BLD AUTO: 0.04 10*3/MM3 (ref 0–0.2)
BASOPHILS NFR BLD AUTO: 0.4 % (ref 0–2)
BILIRUB SERPL-MCNC: 0.8 MG/DL (ref 0.1–1)
BUN BLD-MCNC: 16 MG/DL (ref 5–21)
BUN/CREAT SERPL: 14.7 (ref 7–25)
CALCIUM SPEC-SCNC: 9.3 MG/DL (ref 8.4–10.4)
CHLORIDE SERPL-SCNC: 92 MMOL/L (ref 98–110)
CO2 SERPL-SCNC: 26 MMOL/L (ref 24–31)
CREAT BLD-MCNC: 1.09 MG/DL (ref 0.5–1.4)
D DIMER PPP FEU-MCNC: 1.03 MG/L (FEU) (ref 0–0.5)
DEPRECATED RDW RBC AUTO: 42.5 FL (ref 40–54)
EOSINOPHIL # BLD AUTO: 0.09 10*3/MM3 (ref 0–0.7)
EOSINOPHIL NFR BLD AUTO: 1 % (ref 0–4)
ERYTHROCYTE [DISTWIDTH] IN BLOOD BY AUTOMATED COUNT: 13 % (ref 12–15)
GFR SERPL CREATININE-BSD FRML MDRD: 66 ML/MIN/1.73
GLOBULIN UR ELPH-MCNC: 3.7 GM/DL
GLUCOSE BLD-MCNC: 214 MG/DL (ref 70–100)
HCT VFR BLD AUTO: 36.1 % (ref 40–52)
HGB BLD-MCNC: 12.4 G/DL (ref 14–18)
HOLD SPECIMEN: NORMAL
IMM GRANULOCYTES # BLD: 0.03 10*3/MM3 (ref 0–0.03)
IMM GRANULOCYTES NFR BLD: 0.3 % (ref 0–5)
LYMPHOCYTES # BLD AUTO: 1.37 10*3/MM3 (ref 0.72–4.86)
LYMPHOCYTES NFR BLD AUTO: 14.5 % (ref 15–45)
MAGNESIUM SERPL-MCNC: 2.4 MG/DL (ref 1.4–2.2)
MCH RBC QN AUTO: 30.8 PG (ref 28–32)
MCHC RBC AUTO-ENTMCNC: 34.3 G/DL (ref 33–36)
MCV RBC AUTO: 89.6 FL (ref 82–95)
MONOCYTES # BLD AUTO: 0.61 10*3/MM3 (ref 0.19–1.3)
MONOCYTES NFR BLD AUTO: 6.4 % (ref 4–12)
NEUTROPHILS # BLD AUTO: 7.33 10*3/MM3 (ref 1.87–8.4)
NEUTROPHILS NFR BLD AUTO: 77.4 % (ref 39–78)
NRBC BLD MANUAL-RTO: 0 /100 WBC (ref 0–0)
NT-PROBNP SERPL-MCNC: 6420 PG/ML (ref 0–1800)
PLATELET # BLD AUTO: 331 10*3/MM3 (ref 130–400)
PMV BLD AUTO: 10.5 FL (ref 6–12)
POTASSIUM BLD-SCNC: 4.6 MMOL/L (ref 3.5–5.3)
PROT SERPL-MCNC: 7.8 G/DL (ref 6.3–8.7)
RBC # BLD AUTO: 4.03 10*6/MM3 (ref 4.8–5.9)
SODIUM BLD-SCNC: 131 MMOL/L (ref 135–145)
TROPONIN I SERPL-MCNC: 0.14 NG/ML (ref 0–0.03)
TROPONIN I SERPL-MCNC: 0.27 NG/ML (ref 0–0.03)
WBC NRBC COR # BLD: 9.47 10*3/MM3 (ref 4.8–10.8)
WHOLE BLOOD HOLD SPECIMEN: NORMAL
WHOLE BLOOD HOLD SPECIMEN: NORMAL

## 2018-06-17 PROCEDURE — 83735 ASSAY OF MAGNESIUM: CPT | Performed by: FAMILY MEDICINE

## 2018-06-17 PROCEDURE — 71045 X-RAY EXAM CHEST 1 VIEW: CPT

## 2018-06-17 PROCEDURE — 99284 EMERGENCY DEPT VISIT MOD MDM: CPT

## 2018-06-17 PROCEDURE — 94799 UNLISTED PULMONARY SVC/PX: CPT

## 2018-06-17 PROCEDURE — 93005 ELECTROCARDIOGRAM TRACING: CPT

## 2018-06-17 PROCEDURE — 85025 COMPLETE CBC W/AUTO DIFF WBC: CPT | Performed by: EMERGENCY MEDICINE

## 2018-06-17 PROCEDURE — 25010000002 FUROSEMIDE PER 20 MG: Performed by: FAMILY MEDICINE

## 2018-06-17 PROCEDURE — 73000 X-RAY EXAM OF COLLAR BONE: CPT

## 2018-06-17 PROCEDURE — 84484 ASSAY OF TROPONIN QUANT: CPT | Performed by: FAMILY MEDICINE

## 2018-06-17 PROCEDURE — 94660 CPAP INITIATION&MGMT: CPT

## 2018-06-17 PROCEDURE — 94760 N-INVAS EAR/PLS OXIMETRY 1: CPT

## 2018-06-17 PROCEDURE — 25010000002 FUROSEMIDE PER 20 MG: Performed by: EMERGENCY MEDICINE

## 2018-06-17 PROCEDURE — 84484 ASSAY OF TROPONIN QUANT: CPT | Performed by: EMERGENCY MEDICINE

## 2018-06-17 PROCEDURE — 85379 FIBRIN DEGRADATION QUANT: CPT | Performed by: EMERGENCY MEDICINE

## 2018-06-17 PROCEDURE — 93005 ELECTROCARDIOGRAM TRACING: CPT | Performed by: EMERGENCY MEDICINE

## 2018-06-17 PROCEDURE — 74018 RADEX ABDOMEN 1 VIEW: CPT

## 2018-06-17 PROCEDURE — 83880 ASSAY OF NATRIURETIC PEPTIDE: CPT | Performed by: EMERGENCY MEDICINE

## 2018-06-17 PROCEDURE — 93010 ELECTROCARDIOGRAM REPORT: CPT | Performed by: INTERNAL MEDICINE

## 2018-06-17 PROCEDURE — 80053 COMPREHEN METABOLIC PANEL: CPT | Performed by: EMERGENCY MEDICINE

## 2018-06-17 PROCEDURE — 25010000002 ENOXAPARIN PER 10 MG: Performed by: FAMILY MEDICINE

## 2018-06-17 RX ORDER — GLIPIZIDE 5 MG/1
2.5 TABLET ORAL
Status: DISCONTINUED | OUTPATIENT
Start: 2018-06-18 | End: 2018-06-18 | Stop reason: HOSPADM

## 2018-06-17 RX ORDER — ONDANSETRON 2 MG/ML
4 INJECTION INTRAMUSCULAR; INTRAVENOUS EVERY 6 HOURS PRN
Status: DISCONTINUED | OUTPATIENT
Start: 2018-06-17 | End: 2018-06-18 | Stop reason: HOSPADM

## 2018-06-17 RX ORDER — SENNA AND DOCUSATE SODIUM 50; 8.6 MG/1; MG/1
2 TABLET, FILM COATED ORAL NIGHTLY
Status: DISCONTINUED | OUTPATIENT
Start: 2018-06-17 | End: 2018-06-18 | Stop reason: HOSPADM

## 2018-06-17 RX ORDER — ASPIRIN 81 MG/1
81 TABLET ORAL DAILY
Status: DISCONTINUED | OUTPATIENT
Start: 2018-06-17 | End: 2018-06-18 | Stop reason: HOSPADM

## 2018-06-17 RX ORDER — FUROSEMIDE 10 MG/ML
40 INJECTION INTRAMUSCULAR; INTRAVENOUS EVERY 12 HOURS
Status: DISCONTINUED | OUTPATIENT
Start: 2018-06-17 | End: 2018-06-18

## 2018-06-17 RX ORDER — HYDROCODONE BITARTRATE AND ACETAMINOPHEN 5; 325 MG/1; MG/1
1 TABLET ORAL EVERY 4 HOURS PRN
Status: DISCONTINUED | OUTPATIENT
Start: 2018-06-17 | End: 2018-06-18 | Stop reason: HOSPADM

## 2018-06-17 RX ORDER — ALUMINA, MAGNESIA, AND SIMETHICONE 2400; 2400; 240 MG/30ML; MG/30ML; MG/30ML
15 SUSPENSION ORAL EVERY 6 HOURS PRN
Status: DISCONTINUED | OUTPATIENT
Start: 2018-06-17 | End: 2018-06-18 | Stop reason: HOSPADM

## 2018-06-17 RX ORDER — FUROSEMIDE 10 MG/ML
40 INJECTION INTRAMUSCULAR; INTRAVENOUS ONCE
Status: COMPLETED | OUTPATIENT
Start: 2018-06-17 | End: 2018-06-17

## 2018-06-17 RX ORDER — CLOPIDOGREL BISULFATE 75 MG/1
75 TABLET ORAL DAILY
Status: DISCONTINUED | OUTPATIENT
Start: 2018-06-17 | End: 2018-06-18 | Stop reason: HOSPADM

## 2018-06-17 RX ORDER — SPIRONOLACTONE 25 MG/1
25 TABLET ORAL DAILY
Status: DISCONTINUED | OUTPATIENT
Start: 2018-06-17 | End: 2018-06-18 | Stop reason: HOSPADM

## 2018-06-17 RX ORDER — METOPROLOL SUCCINATE 25 MG/1
25 TABLET, EXTENDED RELEASE ORAL DAILY
Status: DISCONTINUED | OUTPATIENT
Start: 2018-06-17 | End: 2018-06-18 | Stop reason: HOSPADM

## 2018-06-17 RX ORDER — NITROGLYCERIN 0.4 MG/1
0.4 TABLET SUBLINGUAL
Status: DISCONTINUED | OUTPATIENT
Start: 2018-06-17 | End: 2018-06-18 | Stop reason: HOSPADM

## 2018-06-17 RX ORDER — LISINOPRIL 10 MG/1
10 TABLET ORAL
Status: DISCONTINUED | OUTPATIENT
Start: 2018-06-17 | End: 2018-06-18

## 2018-06-17 RX ORDER — ACETAMINOPHEN 325 MG/1
650 TABLET ORAL EVERY 4 HOURS PRN
Status: DISCONTINUED | OUTPATIENT
Start: 2018-06-17 | End: 2018-06-18 | Stop reason: HOSPADM

## 2018-06-17 RX ORDER — SODIUM CHLORIDE 0.9 % (FLUSH) 0.9 %
1-10 SYRINGE (ML) INJECTION AS NEEDED
Status: DISCONTINUED | OUTPATIENT
Start: 2018-06-17 | End: 2018-06-18 | Stop reason: HOSPADM

## 2018-06-17 RX ADMIN — FUROSEMIDE 40 MG: 10 INJECTION, SOLUTION INTRAMUSCULAR; INTRAVENOUS at 17:11

## 2018-06-17 RX ADMIN — FUROSEMIDE 40 MG: 10 INJECTION, SOLUTION INTRAMUSCULAR; INTRAVENOUS at 20:48

## 2018-06-17 RX ADMIN — ENOXAPARIN SODIUM 40 MG: 40 INJECTION SUBCUTANEOUS at 20:48

## 2018-06-17 NOTE — ED PROVIDER NOTES
Subjective   Patient is a 77-year-old male who presents with shortness of breath and chest pain.  History of previous coronary artery disease requiring multiple stent placement at [facility] in [place].  His cardiologist is here Dr. Whatley.  He notes he has had chronic shortness of breath since November which has been intermittent.  Worse with exertion.  He does have PND and orthopnea.  He also describes productive cough with sputum but no fevers.  Intermittent chest pain today.  Left chest.  Radiates throughout left chest.  No obvious exacerbating or relieving factors.  He has been diaphoretic.  Patient has a previous hyperkalemia.  Previous echocardiogram showed EF of 30%.  He has had some difficulty with constipation recently but denies abdominal pain, vomiting, nausea, diarrhea.            Review of Systems   Constitutional: Positive for diaphoresis. Negative for fever.   HENT: Negative for sore throat.    Eyes: Negative for visual disturbance.   Respiratory: Positive for cough and shortness of breath. Negative for wheezing and stridor.    Cardiovascular: Positive for chest pain. Negative for palpitations and leg swelling.   Gastrointestinal: Positive for constipation. Negative for abdominal distention, abdominal pain, blood in stool, diarrhea, nausea and vomiting.   Genitourinary: Negative for hematuria.   Musculoskeletal: Negative for back pain.   Skin: Negative for rash.   Neurological: Negative for dizziness, syncope, light-headedness and headaches.       Past Medical History:   Diagnosis Date   • Bundle branch block, right    • CAD, multiple vessel    • CHF (congestive heart failure)    • Diabetes mellitus    • Dyspnea    • Hearing loss    • HLD (hyperlipidemia)    • NSTEMI (non-ST elevated myocardial infarction)    • Parkinsons        Allergies   Allergen Reactions   • Demerol [Meperidine] Delirium       Past Surgical History:   Procedure Laterality Date   • CATARACT EXTRACTION     • CORONARY STENT  PLACEMENT         No family history on file.    Social History     Social History   • Marital status:      Social History Main Topics   • Smoking status: Never Smoker   • Smokeless tobacco: Never Used   • Alcohol use No   • Drug use: No   • Sexual activity: Defer     Other Topics Concern   • Not on file       Lab Results (last 24 hours)     Procedure Component Value Units Date/Time    CBC & Differential [[id number]] Collected:  06/17/18 1640    Specimen:  Blood Updated:  06/17/18 1651    Narrative:       The following orders were created for panel order CBC & Differential.  Procedure                               Abnormality         Status                     ---------                               -----------         ------                     CBC Auto Differential[[id number]]        Abnormal            Final result                 Please view results for these tests on the individual orders.    Comprehensive Metabolic Panel [[id number]]  (Abnormal) Collected:  06/17/18 1640    Specimen:  Blood Updated:  06/17/18 1709     Glucose 214 (H) mg/dL      BUN 16 mg/dL      Creatinine 1.09 mg/dL      Sodium 131 (L) mmol/L      Potassium 4.6 mmol/L      Chloride 92 (L) mmol/L      CO2 26.0 mmol/L      Calcium 9.3 mg/dL      Total Protein 7.8 g/dL      Albumin 4.10 g/dL      ALT (SGPT) 22 U/L      AST (SGOT) 32 U/L      Alkaline Phosphatase 46 U/L      Total Bilirubin 0.8 mg/dL      eGFR Non African Amer 66 mL/min/1.73      Globulin 3.7 gm/dL      A/G Ratio 1.1 g/dL      BUN/Creatinine Ratio 14.7     Anion Gap 13.0 mmol/L     Narrative:       The MDRD GFR formula is only valid for adults with stable renal function between ages 18 and 70.    BNP [[id number]]  (Abnormal) Collected:  06/17/18 1640    Specimen:  Blood Updated:  06/17/18 1721     proBNP 6,420.0 (H) pg/mL     D-dimer, Quantitative [[id number]]  (Abnormal) Collected:  06/17/18 1640    Specimen:  Blood Updated:  06/17/18 1701     D-Dimer, Quantitative 1.03 (H)  mg/L (FEU)     Narrative:       Reference Range is 0-0.50 mg/L FEU. However, results <0.50 mg/L FEU tends to rule out DVT or PE. Results >0.50 mg/L FEU are not useful in predicting absence or presence of DVT or PE.    Troponin [[id number]]  (Abnormal) Collected:  06/17/18 1640    Specimen:  Blood Updated:  06/17/18 1721     Troponin I 0.137 (H) ng/mL     CBC Auto Differential [[id number]]  (Abnormal) Collected:  06/17/18 1640    Specimen:  Blood Updated:  06/17/18 1651     WBC 9.47 10*3/mm3      RBC 4.03 (L) 10*6/mm3      Hemoglobin 12.4 (L) g/dL      Hematocrit 36.1 (L) %      MCV 89.6 fL      MCH 30.8 pg      MCHC 34.3 g/dL      RDW 13.0 %      RDW-SD 42.5 fl      MPV 10.5 fL      Platelets 331 10*3/mm3      Neutrophil % 77.4 %      Lymphocyte % 14.5 (L) %      Monocyte % 6.4 %      Eosinophil % 1.0 %      Basophil % 0.4 %      Immature Grans % 0.3 %      Neutrophils, Absolute 7.33 10*3/mm3      Lymphocytes, Absolute 1.37 10*3/mm3      Monocytes, Absolute 0.61 10*3/mm3      Eosinophils, Absolute 0.09 10*3/mm3      Basophils, Absolute 0.04 10*3/mm3      Immature Grans, Absolute 0.03 10*3/mm3      nRBC 0.0 /100 WBC           Objective   Physical Exam   Constitutional: He is oriented to person, place, and time. He appears well-developed and well-nourished. He is cooperative.  Non-toxic appearance. He appears ill. He appears distressed.   HENT:   Head: Atraumatic.   Mouth/Throat: Oropharynx is clear and moist and mucous membranes are normal.   Eyes: EOM are normal. Pupils are equal, round, and reactive to light.   Neck: Normal range of motion. Neck supple. No JVD present. No tracheal deviation present.   Cardiovascular: Regular rhythm and normal heart sounds.  Tachycardia present.    Pulmonary/Chest: No stridor. Tachypnea noted. He is in respiratory distress. He has no wheezes. He has no rhonchi. He has rales. He exhibits no tenderness.   Speaking in full sentences but mild respiratory distress   Abdominal: Soft. He  exhibits no distension. There is no tenderness. There is no rigidity, no rebound, no guarding and no CVA tenderness.   Musculoskeletal: Normal range of motion. He exhibits no edema.   Neurological: He is alert and oriented to person, place, and time.   Skin: Skin is warm. He is diaphoretic.   Psychiatric: He has a normal mood and affect.   Nursing note and vitals reviewed.      ECG 12 Lead    Date/Time: 6/17/2018 5:15 PM  Performed by: FLORINA BRISCOE  Authorized by: FLORINA BRISOCE   Interpreted by physician  Comparison: not compared with previous ECG   Rhythm: sinus tachycardia  Rate: tachycardic  QRS axis: left  Conduction: right bundle branch block  Comments: Left axis deviation.  Sinus tachycardia noted with right bundle branch block noted.  Nonspecific ST and T-wave changes.      Critical Care  Performed by: FLORINA BRISCOE  Authorized by: FLORINA BRISCOE     Critical care provider statement:     Critical care time (minutes):  60    Critical care was necessary to treat or prevent imminent or life-threatening deterioration of the following conditions:  Respiratory failure    Critical care was time spent personally by me on the following activities:  Development of treatment plan with patient or surrogate, evaluation of patient's response to treatment, examination of patient, obtaining history from patient or surrogate, ordering and performing treatments and interventions, ordering and review of laboratory studies, ordering and review of radiographic studies, pulse oximetry and re-evaluation of patient's condition             XR Abdomen KUB   Final Result   1. No radiographic evidence of acute abdominopelvic process.            This report was finalized on 06/17/2018 17:02 by Dr. Ernesto Manjarrez MD.      XR Chest 1 View   Final Result   Shallow inspiration with central vascular congestion and   probable mild interstitial edema, with normal heart size. Mild volume   overload is likely. No evidence  "of pneumonia seen.   This report was finalized on 06/17/2018 16:54 by Dr. Hector Kirkland MD.          /73   Pulse 101   Temp 98.7 °F (37.1 °C) (Temporal Artery )   Resp 24   Ht 180.3 cm (71\")   Wt 85.7 kg (189 lb)   SpO2 96%   BMI 26.36 kg/m²     ED Course    ED Course as of Jun 17 1751   Sun Jun 17, 2018 1714 Glucose: (!) 214 [TH]   1715 Sodium: (!) 131 [TH]   1715 Chloride: (!) 92 [TH]   1715 D-dimer, Quant: (!) 1.03 [TH]   1728 proBNP: (!) 6,420.0 [TH]   1728 Troponin I: (!) 0.137 [TH]   1742 Says a 77-year-old male who presents with likely CHF exacerbation.  Patient notes shortness of breath and chest pain.  EKG sinus tachycardia.  BNP 6400 which is slightly improved from previous of 8000.  Troponin 0.137.  His d-dimer is elevated.  Patient was placed on BiPAP given crackles and Lasix.  He has had significant improvement.  The patient's wife and the patient have refused CT imaging because they state they know what is going on she does not want him to have any contrast or a CAT scan.  [TH]   1743 They do understand we could be missing a life-threatening pulmonary embolism but regardless would like to not have any other imaging at this time.  [TH]      ED Course User Index  [TH] Jose Noble MD       Medications   furosemide (LASIX) injection 40 mg (40 mg Intravenous Given 6/17/18 1711)            MDM  Number of Diagnoses or Management Options  Acute on chronic congestive heart failure, unspecified congestive heart failure type: new and requires workup     Amount and/or Complexity of Data Reviewed  Clinical lab tests: reviewed  Tests in the radiology section of CPT®: reviewed  Tests in the medicine section of CPT®: reviewed  Decide to obtain previous medical records or to obtain history from someone other than the patient: yes    Risk of Complications, Morbidity, and/or Mortality  Presenting problems: high  Diagnostic procedures: moderate  Management options: moderate    Critical Care  Total " time providing critical care: 30-74 minutes    Patient Progress  Patient progress: improved      Final diagnoses:   Acute on chronic congestive heart failure, unspecified congestive heart failure type          Jose Noble MD  06/17/18 5010

## 2018-06-17 NOTE — ED NOTES
"At bedside with charge nurse, HOLDEN Chamberlain RN explaining purpose of bipap and lasix. Wife states, \"I don't think you guys understand. He is having a panic attack. Hoa always sedates him and sends him back home and he is fine.\"     Katy Neville RN  06/17/18 4033    "

## 2018-06-17 NOTE — ED NOTES
"Explained CT with IV contrast procedure. Pt's wife stated, \"He is too old to have IV contrast. It will make his parkinson's flare up.\" Pt agreed with wife. CT refused. Dr. Noble notified.     Katy Neville RN  06/17/18 8569    "

## 2018-06-17 NOTE — ED NOTES
Patient having difficulty breathing. Dr. Noble notified. Orders placed.      Katy Neville, RN  06/17/18 6564

## 2018-06-17 NOTE — H&P
"    [facility] Medicine Services  HISTORY AND PHYSICAL    Date of Admission: 6/17/2018  Primary Care Physician: VERONA Don    Subjective     Chief Complaint:   Shortness of breath, constipation    History of Present Illness    A 77-year-old white male is admitted with a chief complaint of shortness of breath.  The history is very difficult to obtain because of the constant interruptions of his wife who seems quite fixated on the overwhelming details regarding his treatment and innumerable non-cardiac related symptoms that he has experienced since November 2017 such as constipation, tingling, urinary volume, etc.  she firmly believes that the stent placement that the patient had performed in November \"caused\" all of his current problems.  It seems that her primary concern for him at present is that of constipation which she feels is causing his shortness of breath, blocks his bowels and makes his medication indigestible and therefore ineffective and explains his previous history of hyperkalemia.  The patient himself complains primarily of dyspnea that has increased over the past week.      Review of Systems   Constitutional: Positive for activity change.   HENT: Negative.    Eyes: Negative.    Respiratory: Positive for shortness of breath.    Cardiovascular: Positive for leg swelling.   Gastrointestinal: Positive for constipation.   Endocrine: Negative.    Genitourinary: Negative.    Musculoskeletal: Negative.    Skin: Negative.    Allergic/Immunologic: Negative.    Neurological: Negative.    Hematological: Negative.    Psychiatric/Behavioral: Negative.       Otherwise complete ROS reviewed and negative except as mentioned in the HPI.    Past Medical History:     Past Medical History:   Diagnosis Date   • Bundle branch block, right    • CAD, multiple vessel    • CHF (congestive heart failure)    • Diabetes mellitus    • Dyspnea    • Hearing loss    • HLD (hyperlipidemia) " "   • Medical non-compliance 5/29/2018   • NSTEMI (non-ST elevated myocardial infarction)    • Parkinsons        Past Surgical History:  Past Surgical History:   Procedure Laterality Date   • CATARACT EXTRACTION     • CORONARY STENT PLACEMENT         Family History:   family history is not on file.    Social History:    reports that he has never smoked. He has never used smokeless tobacco. He reports that he does not drink alcohol or use drugs.    Medications:  Prior to Admission medications    Medication Sig Start Date End Date Taking? Authorizing Provider   aspirin 81 MG EC tablet Take 81 mg by mouth Daily.    Historical Provider, MD   benazepril (LOTENSIN) 10 MG tablet Take 10 mg by mouth Daily.    Historical Provider, MD   clopidogrel (PLAVIX) 75 MG tablet Take 75 mg by mouth Daily.    Historical Provider, MD   glyBURIDE (DIABETA) 1.25 MG tablet Take 5 mg by mouth Daily With Breakfast.    Historical Provider, MD   metoprolol succinate XL (TOPROL-XL) 25 MG 24 hr tablet Take 1 tablet by mouth Daily. 5/29/18   VERONA Epperson   nitroglycerin (NITROSTAT) 0.4 MG SL tablet Place 0.4 mg under the tongue Every 5 (Five) Minutes As Needed for Chest Pain. Take no more than 3 doses in 15 minutes.    Historical Provider, MD       Allergies:  Allergies   Allergen Reactions   • Demerol [Meperidine] Delirium       Objective     Vital Signs:   /68   Pulse 93   Temp 98.7 °F (37.1 °C) (Temporal Artery )   Resp 24   Ht 180.3 cm (71\")   Wt 85.7 kg (189 lb)   SpO2 95%   BMI 26.36 kg/m²     Physical Exam   Constitutional: He is oriented to person, place, and time. He appears well-developed and well-nourished. He is cooperative. He has a sickly appearance.   HENT:   Head: Normocephalic and atraumatic.   Right Ear: External ear normal.   Left Ear: External ear normal.   Nose: Nose normal.   Mouth/Throat: Oropharynx is clear and moist.   Eyes: Conjunctivae and EOM are normal. Pupils are equal, round, and reactive to light. " No scleral icterus.   Neck: Normal range of motion. Neck supple. No JVD present. No tracheal deviation present. No thyromegaly present.   Cardiovascular: Regular rhythm, normal heart sounds and intact distal pulses.  Tachycardia present.    No murmur heard.  Pulmonary/Chest: Effort normal. Tachypnea noted. No respiratory distress. He has decreased breath sounds in the right middle field, the right lower field, the left middle field and the left lower field. He has rales in the right lower field and the left lower field.   Abdominal: Soft. Bowel sounds are normal. He exhibits no distension. There is no tenderness. There is no guarding.   Musculoskeletal: Normal range of motion. He exhibits edema (2/4 BLE).   Neurological: He is alert and oriented to person, place, and time. No cranial nerve deficit. Coordination normal.   Skin: Skin is warm and dry. No rash noted.   Psychiatric: He has a normal mood and affect. His behavior is normal. Judgment and thought content normal.       Results Reviewed:   BNP [[id number]]  (Abnormal) Collected:  06/17/18 1640    Specimen:  Blood Updated:  06/17/18 1721     proBNP 6,420.0 (H) pg/mL     Troponin [[id number]]  (Abnormal) Collected:  06/17/18 1640    Specimen:  Blood Updated:  06/17/18 1721     Troponin I 0.137 (H) ng/mL     Comprehensive Metabolic Panel [[id number]]  (Abnormal) Collected:  06/17/18 1640    Specimen:  Blood Updated:  06/17/18 1709     Glucose 214 (H) mg/dL      BUN 16 mg/dL      Creatinine 1.09 mg/dL      Sodium 131 (L) mmol/L      Potassium 4.6 mmol/L      Chloride 92 (L) mmol/L      CO2 26.0 mmol/L      Calcium 9.3 mg/dL      Total Protein 7.8 g/dL      Albumin 4.10 g/dL      ALT (SGPT) 22 U/L      AST (SGOT) 32 U/L      Alkaline Phosphatase 46 U/L      Total Bilirubin 0.8 mg/dL      eGFR Non African Amer 66 mL/min/1.73      Globulin 3.7 gm/dL      A/G Ratio 1.1 g/dL      BUN/Creatinine Ratio 14.7     Anion Gap 13.0 mmol/L     Narrative:       The MDRD GFR formula  is only valid for adults with stable renal function between ages 18 and 70.    D-dimer, Quantitative [[id number]]  (Abnormal) Collected:  06/17/18 1640    Specimen:  Blood Updated:  06/17/18 1701     D-Dimer, Quantitative 1.03 (H) mg/L (FEU)     CBC Auto Differential [[id number]]  (Abnormal) Collected:  06/17/18 1640    Specimen:  Blood Updated:  06/17/18 1651     WBC 9.47 10*3/mm3      RBC 4.03 (L) 10*6/mm3      Hemoglobin 12.4 (L) g/dL      Hematocrit 36.1 (L) %      MCV 89.6 fL      MCH 30.8 pg      MCHC 34.3 g/dL      RDW 13.0 %      RDW-SD 42.5 fl      MPV 10.5 fL      Platelets 331 10*3/mm3      Neutrophil % 77.4 %      Lymphocyte % 14.5 (L) %      Monocyte % 6.4 %      Eosinophil % 1.0 %      Basophil % 0.4 %      Immature Grans % 0.3 %      Neutrophils, Absolute 7.33 10*3/mm3      Lymphocytes, Absolute 1.37 10*3/mm3      Monocytes, Absolute 0.61 10*3/mm3      Eosinophils, Absolute 0.09 10*3/mm3      Basophils, Absolute 0.04 10*3/mm3      Immature Grans, Absolute 0.03 10*3/mm3      nRBC 0.0 /100 WBC           Xr Chest 1 View    Result Date: 6/17/2018  Narrative: EXAMINATION: XR CHEST 1 VW- 6/17/2018 4:53 PM CDT  HISTORY: Shortness of breath, chest pain.  REPORT: Comparison is made with the study from 5/5/2018.  Lungs are hypoaerated, there are central vascular congestion with normal heart size. No pulmonary consolidation is identified. Mild interstitial edema is not excluded. Coronary artery calcifications or stents are noted. There is no pneumothorax or pleural effusion. The osseous structures show nothing acute, moderate degenerative changes are seen throughout the thoracic spine.      Impression: Shallow inspiration with central vascular congestion and probable mild interstitial edema, with normal heart size. Mild volume overload is likely. No evidence of pneumonia seen. This report was finalized on 06/17/2018 16:54 by Dr. Hector Kirkland MD.    Xr Abdomen Kub    Result Date: 6/17/2018  Narrative: XR ABDOMEN  KUB- 6/17/2018 4:54 PM CDT  HISTORY: constipation   COMPARISON: None  FINDINGS: There is a nonobstructive bowel gas pattern. No pathologic calcification or organomegaly is visualized.  Evaluation for free intraperitoneal air is limited on a supine radiograph, but there are no definite findings of free intraperitoneal air.  Degenerative changes are seen in bilateral hips.      Impression: 1. No radiographic evidence of acute abdominopelvic process.   This report was finalized on 06/17/2018 17:02 by Dr. Ernesto Manjarrez MD.     I have personally reviewed and interpreted the radiology studies and ECG obtained at time of admission.     Assessment / Plan      Assessment & Plan  Hospital Problem List     * (Principal)Acute on chronic congestive heart failure    Coronary artery disease involving native coronary artery of native heart without angina pectoris    Ischemic cardiomyopathy    Chronic systolic CHF (congestive heart failure), NYHA class 3    Essential hypertension    Mixed hyperlipidemia    Type 2 diabetes mellitus with circulatory disorder          PLAN:   Admit to CCU  Lasix 40 mg IV every 12 hours  Cardiology consultation  Serial troponins  Oxygen supplementation    Code Status:   We will code  Surrogate decision-maker is the patient's wife     I discussed the patients findings and my recommendations with: Patient and his wife    Estimated length of stay: Unknown    Maximino Villafuerte DO   06/17/18   6:39 PM

## 2018-06-18 VITALS
OXYGEN SATURATION: 94 % | BODY MASS INDEX: 25.48 KG/M2 | DIASTOLIC BLOOD PRESSURE: 51 MMHG | RESPIRATION RATE: 18 BRPM | HEART RATE: 73 BPM | TEMPERATURE: 97.9 F | HEIGHT: 71 IN | WEIGHT: 182 LBS | SYSTOLIC BLOOD PRESSURE: 90 MMHG

## 2018-06-18 LAB
ANION GAP SERPL CALCULATED.3IONS-SCNC: 12 MMOL/L (ref 4–13)
BASOPHILS # BLD AUTO: 0.04 10*3/MM3 (ref 0–0.2)
BASOPHILS NFR BLD AUTO: 0.4 % (ref 0–2)
BUN BLD-MCNC: 19 MG/DL (ref 5–21)
BUN/CREAT SERPL: 15.8 (ref 7–25)
CALCIUM SPEC-SCNC: 9 MG/DL (ref 8.4–10.4)
CHLORIDE SERPL-SCNC: 90 MMOL/L (ref 98–110)
CO2 SERPL-SCNC: 32 MMOL/L (ref 24–31)
CREAT BLD-MCNC: 1.2 MG/DL (ref 0.5–1.4)
DEPRECATED RDW RBC AUTO: 44.1 FL (ref 40–54)
EOSINOPHIL # BLD AUTO: 0.02 10*3/MM3 (ref 0–0.7)
EOSINOPHIL NFR BLD AUTO: 0.2 % (ref 0–4)
ERYTHROCYTE [DISTWIDTH] IN BLOOD BY AUTOMATED COUNT: 13 % (ref 12–15)
GFR SERPL CREATININE-BSD FRML MDRD: 59 ML/MIN/1.73
GLUCOSE BLD-MCNC: 175 MG/DL (ref 70–100)
GLUCOSE BLDC GLUCOMTR-MCNC: 167 MG/DL (ref 70–130)
GLUCOSE BLDC GLUCOMTR-MCNC: 174 MG/DL (ref 70–130)
HCT VFR BLD AUTO: 33.8 % (ref 40–52)
HGB BLD-MCNC: 11.3 G/DL (ref 14–18)
IMM GRANULOCYTES # BLD: 0.03 10*3/MM3 (ref 0–0.03)
IMM GRANULOCYTES NFR BLD: 0.3 % (ref 0–5)
LYMPHOCYTES # BLD AUTO: 1.13 10*3/MM3 (ref 0.72–4.86)
LYMPHOCYTES NFR BLD AUTO: 10.8 % (ref 15–45)
MAGNESIUM SERPL-MCNC: 2.5 MG/DL (ref 1.4–2.2)
MCH RBC QN AUTO: 31 PG (ref 28–32)
MCHC RBC AUTO-ENTMCNC: 33.4 G/DL (ref 33–36)
MCV RBC AUTO: 92.9 FL (ref 82–95)
MONOCYTES # BLD AUTO: 0.6 10*3/MM3 (ref 0.19–1.3)
MONOCYTES NFR BLD AUTO: 5.8 % (ref 4–12)
NEUTROPHILS # BLD AUTO: 8.6 10*3/MM3 (ref 1.87–8.4)
NEUTROPHILS NFR BLD AUTO: 82.5 % (ref 39–78)
NRBC BLD MANUAL-RTO: 0 /100 WBC (ref 0–0)
PLATELET # BLD AUTO: 293 10*3/MM3 (ref 130–400)
PMV BLD AUTO: 11.5 FL (ref 6–12)
POTASSIUM BLD-SCNC: 4.9 MMOL/L (ref 3.5–5.3)
RBC # BLD AUTO: 3.64 10*6/MM3 (ref 4.8–5.9)
SODIUM BLD-SCNC: 134 MMOL/L (ref 135–145)
TROPONIN I SERPL-MCNC: 0.17 NG/ML (ref 0–0.03)
TROPONIN I SERPL-MCNC: 0.28 NG/ML (ref 0–0.03)
WBC NRBC COR # BLD: 10.42 10*3/MM3 (ref 4.8–10.8)

## 2018-06-18 PROCEDURE — 84484 ASSAY OF TROPONIN QUANT: CPT | Performed by: PHYSICIAN ASSISTANT

## 2018-06-18 PROCEDURE — 82962 GLUCOSE BLOOD TEST: CPT

## 2018-06-18 PROCEDURE — 80048 BASIC METABOLIC PNL TOTAL CA: CPT | Performed by: FAMILY MEDICINE

## 2018-06-18 PROCEDURE — 25010000002 FUROSEMIDE PER 20 MG: Performed by: FAMILY MEDICINE

## 2018-06-18 PROCEDURE — 94799 UNLISTED PULMONARY SVC/PX: CPT

## 2018-06-18 PROCEDURE — 94760 N-INVAS EAR/PLS OXIMETRY 1: CPT

## 2018-06-18 PROCEDURE — 99222 1ST HOSP IP/OBS MODERATE 55: CPT | Performed by: INTERNAL MEDICINE

## 2018-06-18 PROCEDURE — 84484 ASSAY OF TROPONIN QUANT: CPT | Performed by: FAMILY MEDICINE

## 2018-06-18 PROCEDURE — 83735 ASSAY OF MAGNESIUM: CPT | Performed by: FAMILY MEDICINE

## 2018-06-18 PROCEDURE — 85025 COMPLETE CBC W/AUTO DIFF WBC: CPT | Performed by: FAMILY MEDICINE

## 2018-06-18 RX ORDER — FUROSEMIDE 20 MG/1
20 TABLET ORAL DAILY
Status: DISCONTINUED | OUTPATIENT
Start: 2018-06-19 | End: 2018-06-18 | Stop reason: HOSPADM

## 2018-06-18 RX ORDER — FUROSEMIDE 20 MG/1
20 TABLET ORAL DAILY
Qty: 45 TABLET | Refills: 7 | Status: SHIPPED | OUTPATIENT
Start: 2018-06-18 | End: 2018-07-27 | Stop reason: HOSPADM

## 2018-06-18 RX ORDER — LISINOPRIL 10 MG/1
10 TABLET ORAL
Status: DISCONTINUED | OUTPATIENT
Start: 2018-06-18 | End: 2018-06-18 | Stop reason: HOSPADM

## 2018-06-18 RX ORDER — LOSARTAN POTASSIUM 25 MG/1
25 TABLET ORAL
Status: DISCONTINUED | OUTPATIENT
Start: 2018-06-18 | End: 2018-06-18

## 2018-06-18 RX ORDER — METOPROLOL SUCCINATE 25 MG/1
12.5 TABLET, EXTENDED RELEASE ORAL DAILY
Qty: 30 TABLET | Refills: 11
Start: 2018-06-18 | End: 2018-07-27 | Stop reason: HOSPADM

## 2018-06-18 RX ADMIN — SPIRONOLACTONE 25 MG: 25 TABLET ORAL at 08:04

## 2018-06-18 RX ADMIN — FUROSEMIDE 20 MG: 10 INJECTION, SOLUTION INTRAMUSCULAR; INTRAVENOUS at 08:16

## 2018-06-18 RX ADMIN — ASPIRIN 81 MG: 81 TABLET ORAL at 08:04

## 2018-06-18 RX ADMIN — LISINOPRIL 10 MG: 10 TABLET ORAL at 08:03

## 2018-06-18 RX ADMIN — CLOPIDOGREL 75 MG: 75 TABLET, FILM COATED ORAL at 08:03

## 2018-06-18 RX ADMIN — GLIPIZIDE 2.5 MG: 5 TABLET ORAL at 08:04

## 2018-06-18 NOTE — PLAN OF CARE
Problem: Cardiac: Heart Failure (Adult)  Goal: Signs and Symptoms of Listed Potential Problems Will be Absent, Minimized or Managed (Cardiac: Heart Failure)  Outcome: Ongoing (interventions implemented as appropriate)      Problem: Patient Care Overview  Goal: Plan of Care Review  Outcome: Ongoing (interventions implemented as appropriate)   06/18/18 0614   Coping/Psychosocial   Plan of Care Reviewed With patient   Plan of Care Review   Progress improving   OTHER   Outcome Summary No c/o SOA. IV lasix given, good urine output. C/o leg cramps, AM labs pending. Echo and cardiology to see today. NSR on tele. Continue to monitor.

## 2018-06-18 NOTE — CONSULTS
"[facility] HEART GROUP CONSULT NOTE    Referring Provider: Flavia Álvarez MD    Reason for Consultation: acute heart failure    Chief Complaint   Patient presents with   • Shortness of Breath       Subjective .     History of present illness:  Nithin Horn is a 77 y.o. male with history of coronary artery disease, congestive heart failure, hypertension, hyperlipidemia, diabetes mellitus type 2 who presented to [facility] ED overnight with complaints of dyspnea. He tells me that beginning yesterday morning he suddenly developed dyspnea. He states that he woke feeling very short of breath. He describes occasional PND and orthopnea. He reports he has not been taking any lasix. He also has not been compliant with Toprol-XL. His wife states \"he's only supposed to be taking low dose Metoprolol and they sent him in 25 mg. He can't do that.\" He reports use of herbal supplements and his wife requires him to drink 3 quarts of fluid daily. He reports his potassium going too high due to Coreg in the past, which I explained to them does not correlate, and numerous other issues with other medications. He and his wife state \"he feels much better\". They state \"we are ready to go home. Can you send us home? We have animals to feed and things to do.\" He denies any orthopnea or similar overnight.     History  Past Medical History:   Diagnosis Date   • Bundle branch block, right    • CAD, multiple vessel    • CHF (congestive heart failure)    • Diabetes mellitus    • Dyspnea    • Hearing loss    • HLD (hyperlipidemia)    • Medical non-compliance 5/29/2018   • NSTEMI (non-ST elevated myocardial infarction)    • Parkinsons    ,   Past Surgical History:   Procedure Laterality Date   • CATARACT EXTRACTION     • CORONARY STENT PLACEMENT     ,   No family history on file.,   Social History   Substance Use Topics   • Smoking status: Never Smoker   • Smokeless tobacco: Never Used   • Alcohol use No   ,     Medications  Current " Facility-Administered Medications   Medication Dose Route Frequency Provider Last Rate Last Dose   • acetaminophen (TYLENOL) tablet 650 mg  650 mg Oral Q4H PRN Maximino Villafuerte, DO       • aluminum-magnesium hydroxide-simethicone (MAALOX MAX) 400-400-40 MG/5ML suspension 15 mL  15 mL Oral Q6H PRN Maximino Villafuerte, DO       • aspirin EC tablet 81 mg  81 mg Oral Daily Maximino Villafuerte, DO   81 mg at 06/18/18 0804   • clopidogrel (PLAVIX) tablet 75 mg  75 mg Oral Daily Maximino Villafuerte, DO   75 mg at 06/18/18 0803   • enoxaparin (LOVENOX) syringe 40 mg  40 mg Subcutaneous Q24H Maximino Villafuerte, DO   40 mg at 06/17/18 2048   • furosemide (LASIX) injection 40 mg  40 mg Intravenous Q12H Maximino Villafuerte, DO   20 mg at 06/18/18 0816   • glipiZIDE (GLUCOTROL) tablet 2.5 mg  2.5 mg Oral QAM AC Maximino Villafuerte, DO   2.5 mg at 06/18/18 0804   • HYDROcodone-acetaminophen (NORCO) 5-325 MG per tablet 1 tablet  1 tablet Oral Q4H PRN Maximino Villafuerte DO       • lisinopril (PRINIVIL,ZESTRIL) tablet 10 mg  10 mg Oral Q24H Esme Bueno PA-C       • metoprolol succinate XL (TOPROL-XL) 24 hr tablet 25 mg  25 mg Oral Daily Maximino Villafuerte, DO       • nitroglycerin (NITROSTAT) SL tablet 0.4 mg  0.4 mg Sublingual Q5 Min PRN Maximino Villafuerte DO       • ondansetron (ZOFRAN) injection 4 mg  4 mg Intravenous Q6H PRN Maximino Villafuerte DO       • sennosides-docusate sodium (SENOKOT-S) 8.6-50 MG tablet 2 tablet  2 tablet Oral Nightly Maximino Villafuerte DO       • sodium chloride 0.9 % flush 1-10 mL  1-10 mL Intravenous PRN Maximino Villafuerte DO       • spironolactone (ALDACTONE) tablet 25 mg  25 mg Oral Daily Maximino Villafuerte, DO   25 mg at 06/18/18 0804       Allergies:  Demerol [meperidine]    Review of Systems  Review of Systems   Constitution: Negative for malaise/fatigue and weight gain.   Cardiovascular: Positive for orthopnea and paroxysmal nocturnal dyspnea. Negative for chest pain, claudication, dyspnea on  "exertion, irregular heartbeat, leg swelling, near-syncope, palpitations and syncope.   Respiratory: Positive for cough (at night) and shortness of breath. Negative for hemoptysis.    Hematologic/Lymphatic: Negative for bleeding problem.   Skin: Negative for poor wound healing.   Musculoskeletal: Negative for myalgias.   Gastrointestinal: Negative for melena, nausea and vomiting.   Genitourinary: Negative for hematuria.   Neurological: Negative for dizziness, focal weakness and light-headedness.   Psychiatric/Behavioral: Negative for memory loss.   All other systems reviewed and are negative.      Objective     Physical Exam:  Patient Vitals for the past 24 hrs:   BP Temp Temp src Pulse Resp SpO2 Height Weight   06/18/18 0810 134/65 98.2 °F (36.8 °C) Temporal Art 82 18 98 % - -   06/18/18 0757 - - - - - 98 % - -   06/18/18 0401 132/72 97.9 °F (36.6 °C) Temporal Art 82 20 98 % - -   06/17/18 2310 113/62 97.9 °F (36.6 °C) Temporal Art 85 20 97 % - -   06/17/18 2155 - - - - - 98 % - -   06/17/18 1842 135/65 97.3 °F (36.3 °C) Temporal Art 90 20 100 % 180.3 cm (71\") 82.6 kg (182 lb)   06/17/18 1746 115/68 - - 93 - 95 % - -   06/17/18 1731 131/73 - - 101 24 96 % - -   06/17/18 1717 133/73 - - - - 97 % - -   06/17/18 1712 159/86 - - (!) 128 - 94 % - -   06/17/18 1711 (!) 185/91 - - (!) 128 - 96 % - -   06/17/18 1638 - 98.7 °F (37.1 °C) Temporal Art - - - - -   06/17/18 1622 157/85 - - 117 25 91 % 180.3 cm (71\") 85.7 kg (189 lb)     Physical Exam   Constitutional: He is oriented to person, place, and time. He appears well-developed and well-nourished.   HENT:   Head: Normocephalic and atraumatic.   Eyes: Conjunctivae and EOM are normal. Pupils are equal, round, and reactive to light.   Neck: Normal range of motion. Neck supple. No JVD present.   Cardiovascular: Normal rate, regular rhythm, S1 normal, S2 normal, normal heart sounds, intact distal pulses and normal pulses.    No murmur heard.  Pulmonary/Chest: Effort normal " and breath sounds normal. No respiratory distress.   Abdominal: Soft. Bowel sounds are normal. He exhibits no distension.   Musculoskeletal: He exhibits no edema or tenderness.   Neurological: He is alert and oriented to person, place, and time.   Skin: Skin is warm and dry.   Psychiatric: He has a normal mood and affect. Judgment normal.   Vitals reviewed.      Results Review:   I reviewed the patient's new clinical results.    Lab Results (last 72 hours)     Procedure Component Value Units Date/Time    POC Glucose Once [[id number]]  (Abnormal) Collected:  06/18/18 0810    Specimen:  Blood Updated:  06/18/18 0853     Glucose 174 (H) mg/dL      Comment: [facility] [id number]       Troponin [[id number]]  (Abnormal) Collected:  06/18/18 0512    Specimen:  Blood Updated:  06/18/18 0545     Troponin I 0.279 (H) ng/mL     Basic Metabolic Panel [[id number]]  (Abnormal) Collected:  06/18/18 0512    Specimen:  Blood Updated:  06/18/18 0533     Glucose 175 (H) mg/dL      BUN 19 mg/dL      Creatinine 1.20 mg/dL      Sodium 134 (L) mmol/L      Potassium 4.9 mmol/L      Chloride 90 (L) mmol/L      CO2 32.0 (H) mmol/L      Calcium 9.0 mg/dL      eGFR Non African Amer 59 (L) mL/min/1.73      BUN/Creatinine Ratio 15.8     Anion Gap 12.0 mmol/L     Narrative:       The MDRD GFR formula is only valid for adults with stable renal function between ages 18 and 70.    Magnesium [[id number]]  (Abnormal) Collected:  06/18/18 0512    Specimen:  Blood Updated:  06/18/18 0533     Magnesium 2.5 (H) mg/dL     CBC & Differential [[id number]] Collected:  06/18/18 0513    Specimen:  Blood Updated:  06/18/18 0524    Narrative:       The following orders were created for panel order CBC & Differential.  Procedure                               Abnormality         Status                     ---------                               -----------         ------                     CBC Auto Differential[[id number]]        Abnormal             Final result                 Please view results for these tests on the individual orders.    CBC Auto Differential [[id number]]  (Abnormal) Collected:  06/18/18 0513    Specimen:  Blood Updated:  06/18/18 0524     WBC 10.42 10*3/mm3      RBC 3.64 (L) 10*6/mm3      Hemoglobin 11.3 (L) g/dL      Hematocrit 33.8 (L) %      MCV 92.9 fL      MCH 31.0 pg      MCHC 33.4 g/dL      RDW 13.0 %      RDW-SD 44.1 fl      MPV 11.5 fL      Platelets 293 10*3/mm3      Neutrophil % 82.5 (H) %      Lymphocyte % 10.8 (L) %      Monocyte % 5.8 %      Eosinophil % 0.2 %      Basophil % 0.4 %      Immature Grans % 0.3 %      Neutrophils, Absolute 8.60 (H) 10*3/mm3      Lymphocytes, Absolute 1.13 10*3/mm3      Monocytes, Absolute 0.60 10*3/mm3      Eosinophils, Absolute 0.02 10*3/mm3      Basophils, Absolute 0.04 10*3/mm3      Immature Grans, Absolute 0.03 10*3/mm3      nRBC 0.0 /100 WBC     Troponin [[id number]]  (Abnormal) Collected:  06/17/18 2236    Specimen:  Blood Updated:  06/17/18 2324     Troponin I 0.267 (H) ng/mL     Magnesium [[id number]]  (Abnormal) Collected:  06/17/18 1640    Specimen:  Blood Updated:  06/17/18 2000     Magnesium 2.4 (H) mg/dL     [place] Draw [[id number]] Collected:  06/17/18 1640    Specimen:  Blood Updated:  06/17/18 1800    Narrative:       The following orders were created for panel order [place] Draw.  Procedure                               Abnormality         Status                     ---------                               -----------         ------                     Light Blue Top[[id number]]                                   Final result               Green Top (Gel)[[id number]]                                  Final result               Lavender Top[[id number]]                                     Final result               Red Top[[id number]]                                          Final result               Blood Culture Bottle Set[[id number]]                         Final result                  Please view results for these tests on the individual orders.    Blood Culture Bottle Set [[id number]] Collected:  06/17/18 1640    Specimen:  Blood Updated:  06/17/18 1800     Extra Tube Hold for add-ons.     Comment: Auto resulted.       Light Blue Top [[id number]] Collected:  06/17/18 1640    Specimen:  Blood Updated:  06/17/18 1745     Extra Tube hold for add-on     Comment: Auto resulted       Green Top (Gel) [[id number]] Collected:  06/17/18 1640    Specimen:  Blood Updated:  06/17/18 1745     Extra Tube Hold for add-ons.     Comment: Auto resulted.       Lavender Top [[id number]] Collected:  06/17/18 1640    Specimen:  Blood Updated:  06/17/18 1745     Extra Tube hold for add-on     Comment: Auto resulted       Red Top [[id number]] Collected:  06/17/18 1640    Specimen:  Blood Updated:  06/17/18 1745     Extra Tube Hold for add-ons.     Comment: Auto resulted.       BNP [[id number]]  (Abnormal) Collected:  06/17/18 1640    Specimen:  Blood Updated:  06/17/18 1721     proBNP 6,420.0 (H) pg/mL     Troponin [[id number]]  (Abnormal) Collected:  06/17/18 1640    Specimen:  Blood Updated:  06/17/18 1721     Troponin I 0.137 (H) ng/mL     Comprehensive Metabolic Panel [[id number]]  (Abnormal) Collected:  06/17/18 1640    Specimen:  Blood Updated:  06/17/18 1709     Glucose 214 (H) mg/dL      BUN 16 mg/dL      Creatinine 1.09 mg/dL      Sodium 131 (L) mmol/L      Potassium 4.6 mmol/L      Chloride 92 (L) mmol/L      CO2 26.0 mmol/L      Calcium 9.3 mg/dL      Total Protein 7.8 g/dL      Albumin 4.10 g/dL      ALT (SGPT) 22 U/L      AST (SGOT) 32 U/L      Alkaline Phosphatase 46 U/L      Total Bilirubin 0.8 mg/dL      eGFR Non African Amer 66 mL/min/1.73      Globulin 3.7 gm/dL      A/G Ratio 1.1 g/dL      BUN/Creatinine Ratio 14.7     Anion Gap 13.0 mmol/L     Narrative:       The MDRD GFR formula is only valid for adults with stable renal function between ages 18 and 70.    D-dimer, Quantitative [[id number]]   (Abnormal) Collected:  06/17/18 1640    Specimen:  Blood Updated:  06/17/18 1701     D-Dimer, Quantitative 1.03 (H) mg/L (FEU)     Narrative:       Reference Range is 0-0.50 mg/L FEU. However, results <0.50 mg/L FEU tends to rule out DVT or PE. Results >0.50 mg/L FEU are not useful in predicting absence or presence of DVT or PE.    CBC & Differential [[id number]] Collected:  06/17/18 1640    Specimen:  Blood Updated:  06/17/18 1651    Narrative:       The following orders were created for panel order CBC & Differential.  Procedure                               Abnormality         Status                     ---------                               -----------         ------                     CBC Auto Differential[[id number]]        Abnormal            Final result                 Please view results for these tests on the individual orders.    CBC Auto Differential [[id number]]  (Abnormal) Collected:  06/17/18 1640    Specimen:  Blood Updated:  06/17/18 1651     WBC 9.47 10*3/mm3      RBC 4.03 (L) 10*6/mm3      Hemoglobin 12.4 (L) g/dL      Hematocrit 36.1 (L) %      MCV 89.6 fL      MCH 30.8 pg      MCHC 34.3 g/dL      RDW 13.0 %      RDW-SD 42.5 fl      MPV 10.5 fL      Platelets 331 10*3/mm3      Neutrophil % 77.4 %      Lymphocyte % 14.5 (L) %      Monocyte % 6.4 %      Eosinophil % 1.0 %      Basophil % 0.4 %      Immature Grans % 0.3 %      Neutrophils, Absolute 7.33 10*3/mm3      Lymphocytes, Absolute 1.37 10*3/mm3      Monocytes, Absolute 0.61 10*3/mm3      Eosinophils, Absolute 0.09 10*3/mm3      Basophils, Absolute 0.04 10*3/mm3      Immature Grans, Absolute 0.03 10*3/mm3      nRBC 0.0 /100 WBC           Lab Results   Component Value Date    ECHOEFEST 30 05/07/2018       Imaging Results (last 72 hours)     Procedure Component Value Units Date/Time    XR Clavicle Left [[id number]] Collected:  06/17/18 2134     Updated:  06/17/18 2138    Narrative:       XR CLAVICLE LEFT- 6/17/2018 9:27 PM CDT     HISTORY: mass  like deformity medial clavicle; I50.9-Heart failure,  unspecified      COMPARISON: None     FINDINGS:  Frontal and lordotic radiographs of the LEFT clavicle demonstrate a  subtle irregularity in the mid to distal shaft that may represent an old  fracture. No bony masses are seen. Moderate degenerative changes are  seen in the acromioclavicular and glenohumeral joints.       Impression:       1. No acute bony abnormality in the LEFT clavicle.  2. Degenerative changes in the acromioclavicular and glenohumeral  joints.        This report was finalized on 06/17/2018 21:35 by Dr. Ernesto Manjarrez MD.    XR Abdomen KUB [[id number]] Collected:  06/17/18 1702     Updated:  06/17/18 1706    Narrative:       XR ABDOMEN KUB- 6/17/2018 4:54 PM CDT     HISTORY: constipation       COMPARISON: None     FINDINGS:  There is a nonobstructive bowel gas pattern. No pathologic calcification  or organomegaly is visualized.     Evaluation for free intraperitoneal air is limited on a supine  radiograph, but there are no definite findings of free intraperitoneal  air.      Degenerative changes are seen in bilateral hips.        Impression:       1. No radiographic evidence of acute abdominopelvic process.         This report was finalized on 06/17/2018 17:02 by Dr. Ernesto Manjarrez MD.    XR Chest 1 View [[id number]] Collected:  06/17/18 1653     Updated:  06/17/18 1657    Narrative:       EXAMINATION: XR CHEST 1 VW- 6/17/2018 4:53 PM CDT     HISTORY: Shortness of breath, chest pain.     REPORT: Comparison is made with the study from 5/5/2018.     Lungs are hypoaerated, there are central vascular congestion with normal  heart size. No pulmonary consolidation is identified. Mild interstitial  edema is not excluded. Coronary artery calcifications or stents are  noted. There is no pneumothorax or pleural effusion. The osseous  structures show nothing acute, moderate degenerative changes are seen  throughout the thoracic spine.       Impression:     "   Shallow inspiration with central vascular congestion and  probable mild interstitial edema, with normal heart size. Mild volume  overload is likely. No evidence of pneumonia seen.  This report was finalized on 06/17/2018 16:54 by Dr. Hector Kirkland MD.         Assessment   1. Acute systolic congestive heart failure  2. Coronary artery disease: on asa, plavix  3. Hypertension  4. Hyperlipidemia: intolerant to statins due to myalgias  5. Diabetes mellitus type 2    Plan   1. It appears patient and his wife have many of their own medical ideas that are not valid. I have counseled them at great length this morning about the importance of taking a beta blocker and acei for cardioprotective benefit. They have agreed to continue \"low dose\" benazepril and toprol-xl. Apparently his wife has been \"forcing\" him to drink 3 quarts of water daily. I have counseled them to take only 1.5 L at max a day. I have also instructed them to take Lasix 20 mg daily or 40 mg prn for increased edema, dyspnea or weight gain.   2. Repeat troponin. Shows stable, flat trend so far. If this continues with stable, flat trend or declines, would discontinue trending in the setting of this asymptomatic patient. Suspect this to be a type 2 elevation, secondary to acutely decompensated heart failure in known ischemic heart disease.  3. They are adamant about discharge today. I feel this is reasonable, as he now appears compensated. I will follow-up with him in clinic in 2 weeks. We will consider ACEI wash out and transition to Entresto at this time. Will also consider sleep study at this time, as patient wants to think about this. Of note, he has turned in his lifevest, as this doesn't \"fit into his daily life.\" I have informed him and his wife of risks of not wearing this, including sudden cardiac death, and they verbalize understanding.    Further orders per Dr. Whatley upon his evaluation of the patient.     Thank you for the consultation, " cardiology will gladly continue to follow.     Esme Bueno PA-C        Please note this cardiology consultation note is the result of a face to face consultation with the patient, in addition to reviewing medical records at length by myself, Esme Bueno PA-C.    Time: appx 45 minutes

## 2018-06-18 NOTE — DISCHARGE SUMMARY
"    [facility] Medicine Services  DISCHARGE SUMMARY       Date of Admission: 6/17/2018  Date of Discharge:  6/18/2018  Primary Care Physician: VERONA Don    Discharge Diagnoses:  Patient Active Problem List   Diagnosis   • Coronary artery disease involving native coronary artery of native heart without angina pectoris   • Ischemic cardiomyopathy   • Chronic systolic CHF (congestive heart failure), NYHA class 3   • Essential hypertension   • Mixed hyperlipidemia   • Type 2 diabetes mellitus with circulatory disorder   • Acute on chronic congestive heart failure         Presenting Problem/History of Present Illness:  Acute on chronic congestive heart failure, unspecified congestive heart failure type [I50.9]     Chief Complaint on Day of Discharge:  None today    History of Present Illness on Day of Discharge:   The patient is feeling much better and is back to his baseline today after 3100 mL of diuresis.  Cardiology has seen the patient and has made some medication adjustments and has had long discussion with the patient and his wife about their invalid personal medical ideas.  There are reeducated about congestive heart failure.  Cardiology feels that the patient is appropriate for discharge home and the hospitalist service increase.    Hospital Course  A 77-year-old white male is admitted with a chief complaint of shortness of breath.  The history is very difficult to obtain because of the constant interruptions of his wife who seems quite fixated on the overwhelming details regarding his treatment and innumerable non-cardiac related symptoms that he has experienced since November 2017 such as constipation, tingling, urinary volume, etc.  she firmly believes that the stent placement that the patient had performed in November \"caused\" all of his current problems.  It seems that her primary concern for him at present is that of constipation which she feels is causing " "his shortness of breath, blocks his bowels and makes his medication indigestible and therefore ineffective and explains his previous history of hyperkalemia.  The patient himself complains primarily of dyspnea that has increased over the past week.    PLAN:   Admit to CCU  Lasix 40 mg IV every 12 hours  Cardiology consultation  Serial troponins  Oxygen supplementation  The patient is feeling much better and is back to his baseline today after 3100 mL of diuresis.  Cardiology has seen the patient and has made some medication adjustments and has had long discussion with the patient and his wife about their invalid personal medical ideas.  There are reeducated about congestive heart failure.  Cardiology feels that the patient is appropriate for discharge home and the hospitalist service increase.      Consults:   Cardiology:  Attestation signed by Hector Whatley MD at 6/18/2018 2:10 PM   I have personally reviewed EKG and telemetry which reveals SR     Heart- RRR  Lungs- Clear  Ext-no edema     New problems that need Evaluation:  Acute on chronic CHF, much improved. OK to d/c. Repeated CHF teaching  New problems that are stable:  CAD  HLD  HTN     Medical Decision Making:  Moderate  Complexity     I have seen and examined the patient and agree with the findings below       Assessment   1. Acute systolic congestive heart failure  2. Coronary artery disease: on asa, plavix  3. Hypertension  4. Hyperlipidemia: intolerant to statins due to myalgias  5. Diabetes mellitus type 2     Plan   1. It appears patient and his wife have many of their own medical ideas that are not valid. I have counseled them at great length this morning about the importance of taking a beta blocker and acei for cardioprotective benefit. They have agreed to continue \"low dose\" benazepril and toprol-xl. Apparently his wife has been \"forcing\" him to drink 3 quarts of water daily. I have counseled them to take only 1.5 L at max a day. I have also " "instructed them to take Lasix 20 mg daily or 40 mg prn for increased edema, dyspnea or weight gain.   2. Repeat troponin. Shows stable, flat trend so far. If this continues with stable, flat trend or declines, would discontinue trending in the setting of this asymptomatic patient. Suspect this to be a type 2 elevation, secondary to acutely decompensated heart failure in known ischemic heart disease.  3. They are adamant about discharge today. I feel this is reasonable, as he now appears compensated. I will follow-up with him in clinic in 2 weeks. We will consider ACEI wash out and transition to Entresto at this time. Will also consider sleep study at this time, as patient wants to think about this. Of note, he has turned in his lifevest, as this doesn't \"fit into his daily life.\" I have informed him and his wife of risks of not wearing this, including sudden cardiac death, and they verbalize understanding.     Further orders per Dr. Whatley upon his evaluation of the patient.      Thank you for the consultation, cardiology will gladly continue to follow.      Esme Bueno PA-C    Pertinent Test Results:   Lab Results (last 7 days)     Procedure Component Value Units Date/Time    POC Glucose Once [[id number]]  (Abnormal) Collected:  06/18/18 1101    Specimen:  Blood Updated:  06/18/18 1152     Glucose 167 (H) mg/dL      Comment: : [id number] [place]: [id number]       Troponin [[id number]]  (Abnormal) Collected:  06/18/18 1022    Specimen:  Blood Updated:  06/18/18 1127     Troponin I 0.167 (H) ng/mL     POC Glucose Once [[id number]]  (Abnormal) Collected:  06/18/18 0810    Specimen:  Blood Updated:  06/18/18 0853     Glucose 174 (H) mg/dL      Comment: : [id number] [employer] ID: [id number]       Troponin [[id number]]  (Abnormal) Collected:  06/18/18 0512    Specimen:  Blood Updated:  06/18/18 0545     Troponin I 0.279 (H) ng/mL     Basic Metabolic Panel [[id number]]  (Abnormal) Collected:  " 06/18/18 0512    Specimen:  Blood Updated:  06/18/18 0533     Glucose 175 (H) mg/dL      BUN 19 mg/dL      Creatinine 1.20 mg/dL      Sodium 134 (L) mmol/L      Potassium 4.9 mmol/L      Chloride 90 (L) mmol/L      CO2 32.0 (H) mmol/L      Calcium 9.0 mg/dL      eGFR Non African Amer 59 (L) mL/min/1.73      BUN/Creatinine Ratio 15.8     Anion Gap 12.0 mmol/L     Narrative:       The MDRD GFR formula is only valid for adults with stable renal function between ages 18 and 70.    Magnesium [[id number]]  (Abnormal) Collected:  06/18/18 0512    Specimen:  Blood Updated:  06/18/18 0533     Magnesium 2.5 (H) mg/dL     CBC Auto Differential [[id number]]  (Abnormal) Collected:  06/18/18 0513    Specimen:  Blood Updated:  06/18/18 0524     WBC 10.42 10*3/mm3      RBC 3.64 (L) 10*6/mm3      Hemoglobin 11.3 (L) g/dL      Hematocrit 33.8 (L) %      MCV 92.9 fL      MCH 31.0 pg      MCHC 33.4 g/dL      RDW 13.0 %      RDW-SD 44.1 fl      MPV 11.5 fL      Platelets 293 10*3/mm3      Neutrophil % 82.5 (H) %      Lymphocyte % 10.8 (L) %      Monocyte % 5.8 %      Eosinophil % 0.2 %      Basophil % 0.4 %      Immature Grans % 0.3 %      Neutrophils, Absolute 8.60 (H) 10*3/mm3      Lymphocytes, Absolute 1.13 10*3/mm3      Monocytes, Absolute 0.60 10*3/mm3      Eosinophils, Absolute 0.02 10*3/mm3      Basophils, Absolute 0.04 10*3/mm3      Immature Grans, Absolute 0.03 10*3/mm3      nRBC 0.0 /100 WBC     Troponin [[id number]]  (Abnormal) Collected:  06/17/18 2236    Specimen:  Blood Updated:  06/17/18 2324     Troponin I 0.267 (H) ng/mL     Magnesium [[id number]]  (Abnormal) Collected:  06/17/18 1640    Specimen:  Blood Updated:  06/17/18 2000     Magnesium 2.4 (H) mg/dL     BNP [[id number]]  (Abnormal) Collected:  06/17/18 1640    Specimen:  Blood Updated:  06/17/18 1721     proBNP 6,420.0 (H) pg/mL     Troponin [[id number]]  (Abnormal) Collected:  06/17/18 1640    Specimen:  Blood Updated:  06/17/18 1721     Troponin I 0.137 (H) ng/mL      "Comprehensive Metabolic Panel [[id number]]  (Abnormal) Collected:  06/17/18 1640    Specimen:  Blood Updated:  06/17/18 1709     Glucose 214 (H) mg/dL      BUN 16 mg/dL      Creatinine 1.09 mg/dL      Sodium 131 (L) mmol/L      Potassium 4.6 mmol/L      Chloride 92 (L) mmol/L      CO2 26.0 mmol/L      Calcium 9.3 mg/dL      Total Protein 7.8 g/dL      Albumin 4.10 g/dL      ALT (SGPT) 22 U/L      AST (SGOT) 32 U/L      Alkaline Phosphatase 46 U/L      Total Bilirubin 0.8 mg/dL      eGFR Non African Amer 66 mL/min/1.73      Globulin 3.7 gm/dL      A/G Ratio 1.1 g/dL      BUN/Creatinine Ratio 14.7     Anion Gap 13.0 mmol/L     Narrative:       The MDRD GFR formula is only valid for adults with stable renal function between ages 18 and 70.    D-dimer, Quantitative [[id number]]  (Abnormal) Collected:  06/17/18 1640    Specimen:  Blood Updated:  06/17/18 1701     D-Dimer, Quantitative 1.03 (H) mg/L (FEU)     CBC Auto Differential [[id number]]  (Abnormal) Collected:  06/17/18 1640    Specimen:  Blood Updated:  06/17/18 1651     WBC 9.47 10*3/mm3      RBC 4.03 (L) 10*6/mm3      Hemoglobin 12.4 (L) g/dL      Hematocrit 36.1 (L) %      MCV 89.6 fL      MCH 30.8 pg      MCHC 34.3 g/dL      RDW 13.0 %      RDW-SD 42.5 fl      MPV 10.5 fL      Platelets 331 10*3/mm3      Neutrophil % 77.4 %      Lymphocyte % 14.5 (L) %      Monocyte % 6.4 %      Eosinophil % 1.0 %      Basophil % 0.4 %      Immature Grans % 0.3 %      Neutrophils, Absolute 7.33 10*3/mm3      Lymphocytes, Absolute 1.37 10*3/mm3      Monocytes, Absolute 0.61 10*3/mm3      Eosinophils, Absolute 0.09 10*3/mm3      Basophils, Absolute 0.04 10*3/mm3      Immature Grans, Absolute 0.03 10*3/mm3      nRBC 0.0 /100 WBC         Condition on Discharge:    Stable and improved    Physical Exam on Discharge:  BP 96/62 (BP Location: Right arm, Patient Position: Sitting)   Pulse 86   Temp 97.9 °F (36.6 °C) (Temporal Artery )   Resp 18   Ht 180.3 cm (71\")   Wt 82.6 kg (182 lb) "   SpO2 94%   BMI 25.38 kg/m²   Physical Exam  Constitutional: He is oriented to person, place, and time. He appears well-developed and well-nourished. He is cooperative.    HENT:   Head: Normocephalic and atraumatic.   Right Ear: External ear normal.   Left Ear: External ear normal.   Nose: Nose normal.   Mouth/Throat: Oropharynx is clear and moist.   Eyes: Conjunctivae and EOM are normal. Pupils are equal, round, and reactive to light. No scleral icterus.   Neck: Normal range of motion. Neck supple. No JVD present. No tracheal deviation present. No thyromegaly present.   Cardiovascular: Regular rhythm, normal heart sounds and intact distal pulses.  Tachycardia present.    No murmur heard.  Pulmonary/Chest: Effort normal. Tachypnea  resolved. No respiratory distress. He has normal breath sounds in all fields.    Abdominal: Soft. Bowel sounds are normal. He exhibits no distension. There is no tenderness. There is no guarding.   Musculoskeletal: Normal range of motion. He exhibits edema (trace BLE).   Neurological: He is alert and oriented to person, place, and time. No cranial nerve deficit. Coordination normal.   Skin: Skin is warm and dry. No rash noted.   Psychiatric: He has a normal mood and affect. His behavior is normal. Judgment and thought content normal.    Discharge Disposition:  Home or Self Care    Discharge Medications:     Discharge Medications      New Medications      Instructions Start Date   furosemide 20 MG tablet  Commonly known as:  LASIX   20 mg, Oral, Daily, Or take 2 as needed for increased weight gain, shortness of breath         Changes to Medications      Instructions Start Date   metoprolol succinate XL 25 MG 24 hr tablet  Commonly known as:  TOPROL-XL  What changed:  how much to take   12.5 mg, Oral, Daily         Continue These Medications      Instructions Start Date   aspirin 81 MG EC tablet   81 mg, Oral, Daily      benazepril 10 MG tablet  Commonly known as:  LOTENSIN   10 mg,  Oral, Daily      clopidogrel 75 MG tablet  Commonly known as:  PLAVIX   75 mg, Oral, Daily      glyBURIDE 1.25 MG tablet  Commonly known as:  DIAbeta   5 mg, Oral, Daily With Breakfast      nitroglycerin 0.4 MG SL tablet  Commonly known as:  NITROSTAT   0.4 mg, Sublingual, Every 5 Minutes PRN, Take no more than 3 doses in 15 minutes.              Discharge Diet:   Diet Instructions     Diet: Cardiac; Thin       Discharge Diet:  Cardiac    Fluid Consistency:  Thin          Discharge Care Plan / Instructions:   Discharge home    Activity at Discharge:   Activity Instructions     Activity as Tolerated             Follow-up Appointments:  Follow up with PCP next week       Maximino Villafuerte DO  06/18/18  3:09 PM    Time: Discharge less than 30 min    Please note that portions of this note may have been completed with a voice recognition program. Efforts were made to edit the dictations, but occasionally words are mistranscribed.

## 2018-06-18 NOTE — DISCHARGE INSTRUCTIONS
Activity: gradually resume normal activities as tolerated  Diet: Cardiac/low sodium < 2 g Na/fluid restriction 1.5 L daily  Medications: Take all medications as prescribed  Follow-up: Follow-up with [facility] on 6/28/18  Other: check daily weights. Any increase > 2-3 lbs overnight ok to take additional lasix. If recurrent please call our office.

## 2018-06-18 NOTE — PROGRESS NOTES
Discharge Planning Assessment  [facility]     Patient Name: Nithin Horn  MRN: [id number]  Today's Date: 6/18/2018    Admit Date: 6/17/2018          Discharge Needs Assessment     Row Name 06/18/18 7199       Living Environment    Lives With spouse    Current Living Arrangements home/apartment/condo    Primary Care Provided by self    Provides Primary Care For no one    Family Caregiver if Needed spouse    Quality of Family Relationships involved       Resource/Environmental Concerns    Resource/Environmental Concerns none    Transportation Concerns car, none       Transition Planning    Patient/Family Anticipates Transition to home    Patient/Family Anticipated Services at Transition none    Transportation Anticipated car, drives self;family or friend will provide       Discharge Needs Assessment    Readmission Within the Last 30 Days no previous admission in last 30 days    Concerns to be Addressed no discharge needs identified;denies needs/concerns at this time    Equipment Currently Used at Home none    Anticipated Changes Related to Illness none    Equipment Needed After Discharge none            Discharge Plan     Row Name 06/18/18 7057       Plan    Plan PT resides at home with spouse and is independent. PT plans to dc home and denies any needs including HH. Will follow.     Patient/Family in Agreement with Plan yes    Final Discharge Disposition Code 01 - home or self-care        Destination     No service coordination in this encounter.      Durable Medical Equipment     No service coordination in this encounter.      Dialysis/Infusion     No service coordination in this encounter.      Home Medical Care     No service coordination in this encounter.      Social Care     No service coordination in this encounter.                Demographic Summary    No documentation.           Functional Status    No documentation.           Psychosocial    No documentation.           Abuse/Neglect    No documentation.            Legal    No documentation.           Substance Abuse    No documentation.           Patient Forms    No documentation.         Charlotte Leavitt MSW

## 2018-07-23 ENCOUNTER — HOSPITAL ENCOUNTER (INPATIENT)
Facility: HOSPITAL | Age: 78
LOS: 4 days | Discharge: HOME | End: 2018-07-27
Attending: EMERGENCY MEDICINE | Admitting: INTERNAL MEDICINE

## 2018-07-23 ENCOUNTER — APPOINTMENT (OUTPATIENT)
Dept: GENERAL RADIOLOGY | Facility: HOSPITAL | Age: 78
End: 2018-07-23

## 2018-07-23 DIAGNOSIS — R77.8 ELEVATED TROPONIN: ICD-10-CM

## 2018-07-23 DIAGNOSIS — I50.9 ACUTE CONGESTIVE HEART FAILURE, UNSPECIFIED CONGESTIVE HEART FAILURE TYPE: Primary | ICD-10-CM

## 2018-07-23 PROBLEM — D64.9 ANEMIA: Status: ACTIVE | Noted: 2018-07-23

## 2018-07-23 PROBLEM — Z91.199 HISTORY OF NONCOMPLIANCE WITH MEDICAL TREATMENT, PRESENTING HAZARDS TO HEALTH: Status: ACTIVE | Noted: 2018-07-23

## 2018-07-23 LAB
ALBUMIN SERPL-MCNC: 4.6 G/DL (ref 3.5–5)
ALBUMIN/GLOB SERPL: 1.4 G/DL (ref 1.1–2.5)
ALP SERPL-CCNC: 45 U/L (ref 24–120)
ALT SERPL W P-5'-P-CCNC: 42 U/L (ref 0–54)
ANION GAP SERPL CALCULATED.3IONS-SCNC: 12 MMOL/L (ref 4–13)
AST SERPL-CCNC: 41 U/L (ref 7–45)
BASOPHILS # BLD AUTO: 0.05 10*3/MM3 (ref 0–0.2)
BASOPHILS NFR BLD AUTO: 0.6 % (ref 0–2)
BILIRUB SERPL-MCNC: 1.1 MG/DL (ref 0.1–1)
BUN BLD-MCNC: 33 MG/DL (ref 5–21)
BUN/CREAT SERPL: 32.4 (ref 7–25)
CALCIUM SPEC-SCNC: 9.7 MG/DL (ref 8.4–10.4)
CHLORIDE SERPL-SCNC: 87 MMOL/L (ref 98–110)
CO2 SERPL-SCNC: 29 MMOL/L (ref 24–31)
CREAT BLD-MCNC: 1.02 MG/DL (ref 0.5–1.4)
DEPRECATED RDW RBC AUTO: 47.4 FL (ref 40–54)
EOSINOPHIL # BLD AUTO: 0.07 10*3/MM3 (ref 0–0.7)
EOSINOPHIL NFR BLD AUTO: 0.8 % (ref 0–4)
ERYTHROCYTE [DISTWIDTH] IN BLOOD BY AUTOMATED COUNT: 14 % (ref 12–15)
GFR SERPL CREATININE-BSD FRML MDRD: 71 ML/MIN/1.73
GLOBULIN UR ELPH-MCNC: 3.4 GM/DL
GLUCOSE BLD-MCNC: 217 MG/DL (ref 70–100)
HCT VFR BLD AUTO: 37.7 % (ref 40–52)
HGB BLD-MCNC: 12.9 G/DL (ref 14–18)
HOLD SPECIMEN: NORMAL
HOLD SPECIMEN: NORMAL
IMM GRANULOCYTES # BLD: 0.03 10*3/MM3 (ref 0–0.03)
IMM GRANULOCYTES NFR BLD: 0.3 % (ref 0–5)
INR PPP: 1.11 (ref 0.91–1.09)
LYMPHOCYTES # BLD AUTO: 0.92 10*3/MM3 (ref 0.72–4.86)
LYMPHOCYTES NFR BLD AUTO: 10.5 % (ref 15–45)
MCH RBC QN AUTO: 31.8 PG (ref 28–32)
MCHC RBC AUTO-ENTMCNC: 34.2 G/DL (ref 33–36)
MCV RBC AUTO: 92.9 FL (ref 82–95)
MONOCYTES # BLD AUTO: 0.56 10*3/MM3 (ref 0.19–1.3)
MONOCYTES NFR BLD AUTO: 6.4 % (ref 4–12)
NEUTROPHILS # BLD AUTO: 7.12 10*3/MM3 (ref 1.87–8.4)
NEUTROPHILS NFR BLD AUTO: 81.4 % (ref 39–78)
NRBC BLD MANUAL-RTO: 0 /100 WBC (ref 0–0)
NT-PROBNP SERPL-MCNC: ABNORMAL PG/ML (ref 0–1800)
PLATELET # BLD AUTO: 306 10*3/MM3 (ref 130–400)
PMV BLD AUTO: 10.7 FL (ref 6–12)
POTASSIUM BLD-SCNC: 5.3 MMOL/L (ref 3.5–5.3)
PROT SERPL-MCNC: 8 G/DL (ref 6.3–8.7)
PROTHROMBIN TIME: 14.7 SECONDS (ref 11.9–14.6)
RBC # BLD AUTO: 4.06 10*6/MM3 (ref 4.8–5.9)
SODIUM BLD-SCNC: 128 MMOL/L (ref 135–145)
TROPONIN I SERPL-MCNC: 0.07 NG/ML (ref 0–0.03)
TROPONIN I SERPL-MCNC: 0.07 NG/ML (ref 0–0.03)
WBC NRBC COR # BLD: 8.75 10*3/MM3 (ref 4.8–10.8)
WHOLE BLOOD HOLD SPECIMEN: NORMAL
WHOLE BLOOD HOLD SPECIMEN: NORMAL

## 2018-07-23 PROCEDURE — 93005 ELECTROCARDIOGRAM TRACING: CPT | Performed by: EMERGENCY MEDICINE

## 2018-07-23 PROCEDURE — 84484 ASSAY OF TROPONIN QUANT: CPT | Performed by: EMERGENCY MEDICINE

## 2018-07-23 PROCEDURE — 93010 ELECTROCARDIOGRAM REPORT: CPT | Performed by: INTERNAL MEDICINE

## 2018-07-23 PROCEDURE — 99284 EMERGENCY DEPT VISIT MOD MDM: CPT

## 2018-07-23 PROCEDURE — 25010000002 ENOXAPARIN PER 10 MG: Performed by: NURSE PRACTITIONER

## 2018-07-23 PROCEDURE — 71045 X-RAY EXAM CHEST 1 VIEW: CPT

## 2018-07-23 PROCEDURE — 25010000002 FUROSEMIDE PER 20 MG: Performed by: EMERGENCY MEDICINE

## 2018-07-23 PROCEDURE — 80053 COMPREHEN METABOLIC PANEL: CPT | Performed by: EMERGENCY MEDICINE

## 2018-07-23 PROCEDURE — 85610 PROTHROMBIN TIME: CPT | Performed by: EMERGENCY MEDICINE

## 2018-07-23 PROCEDURE — 36415 COLL VENOUS BLD VENIPUNCTURE: CPT | Performed by: EMERGENCY MEDICINE

## 2018-07-23 PROCEDURE — 85025 COMPLETE CBC W/AUTO DIFF WBC: CPT | Performed by: EMERGENCY MEDICINE

## 2018-07-23 PROCEDURE — 94799 UNLISTED PULMONARY SVC/PX: CPT

## 2018-07-23 PROCEDURE — 83880 ASSAY OF NATRIURETIC PEPTIDE: CPT | Performed by: EMERGENCY MEDICINE

## 2018-07-23 RX ORDER — ONDANSETRON 4 MG/1
4 TABLET, ORALLY DISINTEGRATING ORAL EVERY 6 HOURS PRN
Status: DISCONTINUED | OUTPATIENT
Start: 2018-07-23 | End: 2018-07-27 | Stop reason: HOSPADM

## 2018-07-23 RX ORDER — ASPIRIN 81 MG/1
81 TABLET ORAL DAILY
Status: DISCONTINUED | OUTPATIENT
Start: 2018-07-24 | End: 2018-07-27 | Stop reason: HOSPADM

## 2018-07-23 RX ORDER — ONDANSETRON 2 MG/ML
4 INJECTION INTRAMUSCULAR; INTRAVENOUS EVERY 6 HOURS PRN
Status: DISCONTINUED | OUTPATIENT
Start: 2018-07-23 | End: 2018-07-27 | Stop reason: HOSPADM

## 2018-07-23 RX ORDER — SODIUM CHLORIDE 0.9 % (FLUSH) 0.9 %
10 SYRINGE (ML) INJECTION AS NEEDED
Status: DISCONTINUED | OUTPATIENT
Start: 2018-07-23 | End: 2018-07-27 | Stop reason: HOSPADM

## 2018-07-23 RX ORDER — ASPIRIN 81 MG/1
324 TABLET, CHEWABLE ORAL ONCE
Status: DISCONTINUED | OUTPATIENT
Start: 2018-07-23 | End: 2018-07-23

## 2018-07-23 RX ORDER — METOPROLOL SUCCINATE 25 MG/1
12.5 TABLET, EXTENDED RELEASE ORAL DAILY
Status: DISCONTINUED | OUTPATIENT
Start: 2018-07-23 | End: 2018-07-27 | Stop reason: HOSPADM

## 2018-07-23 RX ORDER — ONDANSETRON 4 MG/1
4 TABLET, FILM COATED ORAL EVERY 6 HOURS PRN
Status: DISCONTINUED | OUTPATIENT
Start: 2018-07-23 | End: 2018-07-27 | Stop reason: HOSPADM

## 2018-07-23 RX ORDER — ASPIRIN 81 MG/1
324 TABLET, CHEWABLE ORAL ONCE
Status: COMPLETED | OUTPATIENT
Start: 2018-07-23 | End: 2018-07-23

## 2018-07-23 RX ORDER — GLIPIZIDE 5 MG/1
2.5 TABLET ORAL
Status: DISCONTINUED | OUTPATIENT
Start: 2018-07-24 | End: 2018-07-27 | Stop reason: HOSPADM

## 2018-07-23 RX ORDER — LISINOPRIL 10 MG/1
10 TABLET ORAL
Status: DISCONTINUED | OUTPATIENT
Start: 2018-07-23 | End: 2018-07-27 | Stop reason: HOSPADM

## 2018-07-23 RX ORDER — CLOPIDOGREL BISULFATE 75 MG/1
75 TABLET ORAL DAILY
Status: DISCONTINUED | OUTPATIENT
Start: 2018-07-23 | End: 2018-07-27 | Stop reason: HOSPADM

## 2018-07-23 RX ORDER — FUROSEMIDE 10 MG/ML
40 INJECTION INTRAMUSCULAR; INTRAVENOUS ONCE
Status: COMPLETED | OUTPATIENT
Start: 2018-07-23 | End: 2018-07-23

## 2018-07-23 RX ORDER — NITROGLYCERIN 0.4 MG/1
0.4 TABLET SUBLINGUAL
Status: DISCONTINUED | OUTPATIENT
Start: 2018-07-23 | End: 2018-07-27 | Stop reason: HOSPADM

## 2018-07-23 RX ORDER — SODIUM CHLORIDE 0.9 % (FLUSH) 0.9 %
1-10 SYRINGE (ML) INJECTION AS NEEDED
Status: DISCONTINUED | OUTPATIENT
Start: 2018-07-23 | End: 2018-07-27 | Stop reason: HOSPADM

## 2018-07-23 RX ORDER — ACETAMINOPHEN 325 MG/1
650 TABLET ORAL EVERY 4 HOURS PRN
Status: DISCONTINUED | OUTPATIENT
Start: 2018-07-23 | End: 2018-07-27 | Stop reason: HOSPADM

## 2018-07-23 RX ORDER — NITROGLYCERIN 20 MG/100ML
20 INJECTION INTRAVENOUS
Status: DISCONTINUED | OUTPATIENT
Start: 2018-07-23 | End: 2018-07-23

## 2018-07-23 RX ADMIN — FUROSEMIDE 40 MG: 10 INJECTION, SOLUTION INTRAMUSCULAR; INTRAVENOUS at 17:18

## 2018-07-23 RX ADMIN — ACETAMINOPHEN 650 MG: 325 TABLET, FILM COATED ORAL at 19:53

## 2018-07-23 RX ADMIN — ENOXAPARIN SODIUM 40 MG: 40 INJECTION, SOLUTION INTRAVENOUS; SUBCUTANEOUS at 19:53

## 2018-07-23 RX ADMIN — ASPIRIN 81 MG 243 MG: 81 TABLET ORAL at 17:19

## 2018-07-23 RX ADMIN — NITROGLYCERIN 20 MCG/MIN: 20 INJECTION INTRAVENOUS at 17:21

## 2018-07-24 LAB
ALBUMIN SERPL-MCNC: 4.4 G/DL (ref 3.5–5)
ALBUMIN/GLOB SERPL: 1.3 G/DL (ref 1.1–2.5)
ALP SERPL-CCNC: 51 U/L (ref 24–120)
ALT SERPL W P-5'-P-CCNC: 37 U/L (ref 0–54)
ANION GAP SERPL CALCULATED.3IONS-SCNC: 14 MMOL/L (ref 4–13)
ANISOCYTOSIS BLD QL: ABNORMAL
ARTICHOKE IGE QN: 130 MG/DL (ref 0–99)
AST SERPL-CCNC: 36 U/L (ref 7–45)
BILIRUB SERPL-MCNC: 0.9 MG/DL (ref 0.1–1)
BUN BLD-MCNC: 33 MG/DL (ref 5–21)
BUN/CREAT SERPL: 30.6 (ref 7–25)
CALCIUM SPEC-SCNC: 9.6 MG/DL (ref 8.4–10.4)
CHLORIDE SERPL-SCNC: 90 MMOL/L (ref 98–110)
CHOLEST SERPL-MCNC: 166 MG/DL (ref 130–200)
CO2 SERPL-SCNC: 30 MMOL/L (ref 24–31)
CREAT BLD-MCNC: 1.08 MG/DL (ref 0.5–1.4)
DEPRECATED RDW RBC AUTO: 48 FL (ref 40–54)
EOSINOPHIL # BLD MANUAL: 0.09 10*3/MM3 (ref 0–0.7)
EOSINOPHIL NFR BLD MANUAL: 1 % (ref 0–4)
ERYTHROCYTE [DISTWIDTH] IN BLOOD BY AUTOMATED COUNT: 14.1 % (ref 12–15)
GFR SERPL CREATININE-BSD FRML MDRD: 66 ML/MIN/1.73
GLOBULIN UR ELPH-MCNC: 3.3 GM/DL
GLUCOSE BLD-MCNC: 205 MG/DL (ref 70–100)
HCT VFR BLD AUTO: 37.1 % (ref 40–52)
HDLC SERPL-MCNC: 27 MG/DL
HGB BLD-MCNC: 12.8 G/DL (ref 14–18)
LDLC/HDLC SERPL: 4.41 {RATIO}
LYMPHOCYTES # BLD MANUAL: 0.63 10*3/MM3 (ref 0.72–4.86)
LYMPHOCYTES NFR BLD MANUAL: 5 % (ref 4–12)
LYMPHOCYTES NFR BLD MANUAL: 7 % (ref 15–45)
MACROCYTES BLD QL SMEAR: ABNORMAL
MCH RBC QN AUTO: 32.3 PG (ref 28–32)
MCHC RBC AUTO-ENTMCNC: 34.5 G/DL (ref 33–36)
MCV RBC AUTO: 93.7 FL (ref 82–95)
MONOCYTES # BLD AUTO: 0.45 10*3/MM3 (ref 0.19–1.3)
NEUTROPHILS # BLD AUTO: 7.75 10*3/MM3 (ref 1.87–8.4)
NEUTROPHILS NFR BLD MANUAL: 86 % (ref 39–78)
NT-PROBNP SERPL-MCNC: ABNORMAL PG/ML (ref 0–1800)
PLAT MORPH BLD: NORMAL
PLATELET # BLD AUTO: 296 10*3/MM3 (ref 130–400)
PMV BLD AUTO: 10.8 FL (ref 6–12)
POTASSIUM BLD-SCNC: 5.5 MMOL/L (ref 3.5–5.3)
PROT SERPL-MCNC: 7.7 G/DL (ref 6.3–8.7)
RBC # BLD AUTO: 3.96 10*6/MM3 (ref 4.8–5.9)
SODIUM BLD-SCNC: 134 MMOL/L (ref 135–145)
TRIGL SERPL-MCNC: 100 MG/DL (ref 0–149)
TSH SERPL DL<=0.05 MIU/L-ACNC: 1.29 MIU/ML (ref 0.47–4.68)
VARIANT LYMPHS NFR BLD MANUAL: 1 % (ref 0–5)
WBC MORPH BLD: NORMAL
WBC NRBC COR # BLD: 9.01 10*3/MM3 (ref 4.8–10.8)

## 2018-07-24 PROCEDURE — 85007 BL SMEAR W/DIFF WBC COUNT: CPT | Performed by: NURSE PRACTITIONER

## 2018-07-24 PROCEDURE — 25010000002 FUROSEMIDE PER 20 MG: Performed by: NURSE PRACTITIONER

## 2018-07-24 PROCEDURE — 83880 ASSAY OF NATRIURETIC PEPTIDE: CPT | Performed by: NURSE PRACTITIONER

## 2018-07-24 PROCEDURE — 25010000002 ENOXAPARIN PER 10 MG: Performed by: NURSE PRACTITIONER

## 2018-07-24 PROCEDURE — 99222 1ST HOSP IP/OBS MODERATE 55: CPT | Performed by: INTERNAL MEDICINE

## 2018-07-24 PROCEDURE — 83036 HEMOGLOBIN GLYCOSYLATED A1C: CPT | Performed by: NURSE PRACTITIONER

## 2018-07-24 PROCEDURE — 80053 COMPREHEN METABOLIC PANEL: CPT | Performed by: NURSE PRACTITIONER

## 2018-07-24 PROCEDURE — 84443 ASSAY THYROID STIM HORMONE: CPT | Performed by: NURSE PRACTITIONER

## 2018-07-24 PROCEDURE — 85027 COMPLETE CBC AUTOMATED: CPT | Performed by: NURSE PRACTITIONER

## 2018-07-24 PROCEDURE — 80061 LIPID PANEL: CPT | Performed by: NURSE PRACTITIONER

## 2018-07-24 PROCEDURE — 94799 UNLISTED PULMONARY SVC/PX: CPT

## 2018-07-24 RX ORDER — SENNA AND DOCUSATE SODIUM 50; 8.6 MG/1; MG/1
2 TABLET, FILM COATED ORAL NIGHTLY
Status: DISCONTINUED | OUTPATIENT
Start: 2018-07-24 | End: 2018-07-27 | Stop reason: HOSPADM

## 2018-07-24 RX ORDER — FUROSEMIDE 10 MG/ML
40 INJECTION INTRAMUSCULAR; INTRAVENOUS
Status: DISCONTINUED | OUTPATIENT
Start: 2018-07-24 | End: 2018-07-27

## 2018-07-24 RX ADMIN — FUROSEMIDE 40 MG: 10 INJECTION, SOLUTION INTRAMUSCULAR; INTRAVENOUS at 17:23

## 2018-07-24 RX ADMIN — METOPROLOL SUCCINATE 12.5 MG: 25 TABLET, FILM COATED, EXTENDED RELEASE ORAL at 09:57

## 2018-07-24 RX ADMIN — FUROSEMIDE 40 MG: 10 INJECTION, SOLUTION INTRAMUSCULAR; INTRAVENOUS at 10:12

## 2018-07-24 RX ADMIN — LISINOPRIL 10 MG: 10 TABLET ORAL at 08:45

## 2018-07-24 RX ADMIN — DOCUSATE SODIUM -SENNOSIDES 2 TABLET: 50; 8.6 TABLET, COATED ORAL at 20:30

## 2018-07-24 RX ADMIN — ASPIRIN 81 MG: 81 TABLET ORAL at 08:44

## 2018-07-24 RX ADMIN — ENOXAPARIN SODIUM 40 MG: 40 INJECTION, SOLUTION INTRAVENOUS; SUBCUTANEOUS at 20:30

## 2018-07-24 RX ADMIN — GLIPIZIDE 2.5 MG: 5 TABLET ORAL at 08:44

## 2018-07-24 RX ADMIN — CLOPIDOGREL 75 MG: 75 TABLET, FILM COATED ORAL at 08:45

## 2018-07-25 LAB
ANION GAP SERPL CALCULATED.3IONS-SCNC: 11 MMOL/L (ref 4–13)
BUN BLD-MCNC: 33 MG/DL (ref 5–21)
BUN/CREAT SERPL: 31.1 (ref 7–25)
CALCIUM SPEC-SCNC: 9.2 MG/DL (ref 8.4–10.4)
CHLORIDE SERPL-SCNC: 92 MMOL/L (ref 98–110)
CO2 SERPL-SCNC: 33 MMOL/L (ref 24–31)
CREAT BLD-MCNC: 1.06 MG/DL (ref 0.5–1.4)
GFR SERPL CREATININE-BSD FRML MDRD: 68 ML/MIN/1.73
GLUCOSE BLD-MCNC: 195 MG/DL (ref 70–100)
GLUCOSE BLDC GLUCOMTR-MCNC: 119 MG/DL (ref 70–130)
GLUCOSE BLDC GLUCOMTR-MCNC: 208 MG/DL (ref 70–130)
HBA1C MFR BLD: 7.1 %
POTASSIUM BLD-SCNC: 4.2 MMOL/L (ref 3.5–5.3)
SODIUM BLD-SCNC: 136 MMOL/L (ref 135–145)

## 2018-07-25 PROCEDURE — 80048 BASIC METABOLIC PNL TOTAL CA: CPT | Performed by: NURSE PRACTITIONER

## 2018-07-25 PROCEDURE — 99232 SBSQ HOSP IP/OBS MODERATE 35: CPT | Performed by: INTERNAL MEDICINE

## 2018-07-25 PROCEDURE — 25010000002 ENOXAPARIN PER 10 MG: Performed by: NURSE PRACTITIONER

## 2018-07-25 PROCEDURE — 25010000002 FUROSEMIDE PER 20 MG: Performed by: NURSE PRACTITIONER

## 2018-07-25 PROCEDURE — 94799 UNLISTED PULMONARY SVC/PX: CPT

## 2018-07-25 PROCEDURE — 94760 N-INVAS EAR/PLS OXIMETRY 1: CPT

## 2018-07-25 PROCEDURE — 82962 GLUCOSE BLOOD TEST: CPT

## 2018-07-25 PROCEDURE — 63710000001 INSULIN LISPRO (HUMAN) PER 5 UNITS: Performed by: INTERNAL MEDICINE

## 2018-07-25 RX ORDER — DEXTROSE MONOHYDRATE 25 G/50ML
25 INJECTION, SOLUTION INTRAVENOUS
Status: DISCONTINUED | OUTPATIENT
Start: 2018-07-25 | End: 2018-07-27 | Stop reason: HOSPADM

## 2018-07-25 RX ORDER — NICOTINE POLACRILEX 4 MG
15 LOZENGE BUCCAL
Status: DISCONTINUED | OUTPATIENT
Start: 2018-07-25 | End: 2018-07-27 | Stop reason: HOSPADM

## 2018-07-25 RX ADMIN — FUROSEMIDE 40 MG: 10 INJECTION, SOLUTION INTRAMUSCULAR; INTRAVENOUS at 08:22

## 2018-07-25 RX ADMIN — GLIPIZIDE 2.5 MG: 5 TABLET ORAL at 08:21

## 2018-07-25 RX ADMIN — ENOXAPARIN SODIUM 40 MG: 40 INJECTION, SOLUTION INTRAVENOUS; SUBCUTANEOUS at 21:17

## 2018-07-25 RX ADMIN — ONDANSETRON 4 MG: 4 TABLET, FILM COATED ORAL at 08:20

## 2018-07-25 RX ADMIN — DOCUSATE SODIUM -SENNOSIDES 2 TABLET: 50; 8.6 TABLET, COATED ORAL at 21:17

## 2018-07-25 RX ADMIN — LISINOPRIL 10 MG: 10 TABLET ORAL at 08:22

## 2018-07-25 RX ADMIN — Medication 10 ML: at 21:17

## 2018-07-25 RX ADMIN — METOPROLOL SUCCINATE 12.5 MG: 25 TABLET, FILM COATED, EXTENDED RELEASE ORAL at 08:21

## 2018-07-25 RX ADMIN — POLYETHYLENE GLYCOL (3350) 17 G: 17 POWDER, FOR SOLUTION ORAL at 21:46

## 2018-07-25 RX ADMIN — INSULIN LISPRO 3 UNITS: 100 INJECTION, SOLUTION INTRAVENOUS; SUBCUTANEOUS at 21:17

## 2018-07-25 RX ADMIN — CLOPIDOGREL 75 MG: 75 TABLET, FILM COATED ORAL at 08:22

## 2018-07-25 RX ADMIN — ASPIRIN 81 MG: 81 TABLET ORAL at 08:21

## 2018-07-25 RX ADMIN — FUROSEMIDE 40 MG: 10 INJECTION, SOLUTION INTRAMUSCULAR; INTRAVENOUS at 18:33

## 2018-07-26 LAB
ANION GAP SERPL CALCULATED.3IONS-SCNC: 11 MMOL/L (ref 4–13)
BUN BLD-MCNC: 34 MG/DL (ref 5–21)
BUN/CREAT SERPL: 32.1 (ref 7–25)
CALCIUM SPEC-SCNC: 8.7 MG/DL (ref 8.4–10.4)
CHLORIDE SERPL-SCNC: 94 MMOL/L (ref 98–110)
CO2 SERPL-SCNC: 34 MMOL/L (ref 24–31)
CREAT BLD-MCNC: 1.06 MG/DL (ref 0.5–1.4)
GFR SERPL CREATININE-BSD FRML MDRD: 68 ML/MIN/1.73
GLUCOSE BLD-MCNC: 161 MG/DL (ref 70–100)
GLUCOSE BLDC GLUCOMTR-MCNC: 169 MG/DL (ref 70–130)
GLUCOSE BLDC GLUCOMTR-MCNC: 197 MG/DL (ref 70–130)
GLUCOSE BLDC GLUCOMTR-MCNC: 228 MG/DL (ref 70–130)
GLUCOSE BLDC GLUCOMTR-MCNC: 251 MG/DL (ref 70–130)
POTASSIUM BLD-SCNC: 4.4 MMOL/L (ref 3.5–5.3)
SODIUM BLD-SCNC: 139 MMOL/L (ref 135–145)

## 2018-07-26 PROCEDURE — 25010000002 FUROSEMIDE PER 20 MG: Performed by: NURSE PRACTITIONER

## 2018-07-26 PROCEDURE — 63710000001 INSULIN LISPRO (HUMAN) PER 5 UNITS: Performed by: INTERNAL MEDICINE

## 2018-07-26 PROCEDURE — 80048 BASIC METABOLIC PNL TOTAL CA: CPT | Performed by: NURSE PRACTITIONER

## 2018-07-26 PROCEDURE — 25010000002 ENOXAPARIN PER 10 MG: Performed by: NURSE PRACTITIONER

## 2018-07-26 PROCEDURE — 82962 GLUCOSE BLOOD TEST: CPT

## 2018-07-26 RX ORDER — BISACODYL 10 MG
10 SUPPOSITORY, RECTAL RECTAL DAILY
Status: DISCONTINUED | OUTPATIENT
Start: 2018-07-26 | End: 2018-07-26

## 2018-07-26 RX ORDER — CHLORHEXIDINE GLUCONATE 0.12 MG/ML
15 RINSE ORAL EVERY 12 HOURS SCHEDULED
Status: DISCONTINUED | OUTPATIENT
Start: 2018-07-26 | End: 2018-07-27 | Stop reason: HOSPADM

## 2018-07-26 RX ORDER — BISACODYL 10 MG
10 SUPPOSITORY, RECTAL RECTAL DAILY PRN
Status: DISCONTINUED | OUTPATIENT
Start: 2018-07-26 | End: 2018-07-27 | Stop reason: HOSPADM

## 2018-07-26 RX ADMIN — CLOPIDOGREL 75 MG: 75 TABLET, FILM COATED ORAL at 08:03

## 2018-07-26 RX ADMIN — DOCUSATE SODIUM -SENNOSIDES 2 TABLET: 50; 8.6 TABLET, COATED ORAL at 20:34

## 2018-07-26 RX ADMIN — GLIPIZIDE 2.5 MG: 5 TABLET ORAL at 08:02

## 2018-07-26 RX ADMIN — LISINOPRIL 10 MG: 10 TABLET ORAL at 08:03

## 2018-07-26 RX ADMIN — METOPROLOL SUCCINATE 12.5 MG: 25 TABLET, FILM COATED, EXTENDED RELEASE ORAL at 08:03

## 2018-07-26 RX ADMIN — FUROSEMIDE 40 MG: 10 INJECTION, SOLUTION INTRAMUSCULAR; INTRAVENOUS at 08:03

## 2018-07-26 RX ADMIN — INSULIN LISPRO 3 UNITS: 100 INJECTION, SOLUTION INTRAVENOUS; SUBCUTANEOUS at 12:21

## 2018-07-26 RX ADMIN — POLYETHYLENE GLYCOL (3350) 17 G: 17 POWDER, FOR SOLUTION ORAL at 20:34

## 2018-07-26 RX ADMIN — INSULIN LISPRO 4 UNITS: 100 INJECTION, SOLUTION INTRAVENOUS; SUBCUTANEOUS at 20:34

## 2018-07-26 RX ADMIN — BISACODYL 10 MG: 10 SUPPOSITORY RECTAL at 08:59

## 2018-07-26 RX ADMIN — CHLORHEXIDINE GLUCONATE 15 ML: 1.2 RINSE ORAL at 08:59

## 2018-07-26 RX ADMIN — INSULIN LISPRO 2 UNITS: 100 INJECTION, SOLUTION INTRAVENOUS; SUBCUTANEOUS at 17:03

## 2018-07-26 RX ADMIN — FUROSEMIDE 40 MG: 10 INJECTION, SOLUTION INTRAMUSCULAR; INTRAVENOUS at 17:04

## 2018-07-26 RX ADMIN — ENOXAPARIN SODIUM 40 MG: 40 INJECTION, SOLUTION INTRAVENOUS; SUBCUTANEOUS at 20:46

## 2018-07-26 RX ADMIN — ASPIRIN 81 MG: 81 TABLET ORAL at 08:03

## 2018-07-26 RX ADMIN — CHLORHEXIDINE GLUCONATE 15 ML: 1.2 RINSE ORAL at 20:34

## 2018-07-27 VITALS
OXYGEN SATURATION: 100 % | WEIGHT: 185 LBS | DIASTOLIC BLOOD PRESSURE: 59 MMHG | RESPIRATION RATE: 18 BRPM | HEART RATE: 78 BPM | SYSTOLIC BLOOD PRESSURE: 115 MMHG | HEIGHT: 74 IN | BODY MASS INDEX: 23.74 KG/M2 | TEMPERATURE: 97.8 F

## 2018-07-27 LAB
ANION GAP SERPL CALCULATED.3IONS-SCNC: 11 MMOL/L (ref 4–13)
BUN BLD-MCNC: 35 MG/DL (ref 5–21)
BUN/CREAT SERPL: 33.7 (ref 7–25)
CALCIUM SPEC-SCNC: 9 MG/DL (ref 8.4–10.4)
CHLORIDE SERPL-SCNC: 95 MMOL/L (ref 98–110)
CO2 SERPL-SCNC: 34 MMOL/L (ref 24–31)
CREAT BLD-MCNC: 1.04 MG/DL (ref 0.5–1.4)
GFR SERPL CREATININE-BSD FRML MDRD: 69 ML/MIN/1.73
GLUCOSE BLD-MCNC: 246 MG/DL (ref 70–100)
GLUCOSE BLDC GLUCOMTR-MCNC: 189 MG/DL (ref 70–130)
GLUCOSE BLDC GLUCOMTR-MCNC: 206 MG/DL (ref 70–130)
POTASSIUM BLD-SCNC: 4.7 MMOL/L (ref 3.5–5.3)
SODIUM BLD-SCNC: 140 MMOL/L (ref 135–145)

## 2018-07-27 PROCEDURE — 80048 BASIC METABOLIC PNL TOTAL CA: CPT | Performed by: NURSE PRACTITIONER

## 2018-07-27 PROCEDURE — 25010000002 FUROSEMIDE PER 20 MG: Performed by: NURSE PRACTITIONER

## 2018-07-27 PROCEDURE — 63710000001 INSULIN LISPRO (HUMAN) PER 5 UNITS: Performed by: INTERNAL MEDICINE

## 2018-07-27 PROCEDURE — 82962 GLUCOSE BLOOD TEST: CPT

## 2018-07-27 RX ORDER — CLOPIDOGREL BISULFATE 75 MG/1
75 TABLET ORAL DAILY
Qty: 30 TABLET | Refills: 1 | Status: SHIPPED | OUTPATIENT
Start: 2018-07-28

## 2018-07-27 RX ORDER — MAGNESIUM CARB/ALUMINUM HYDROX 105-160MG
296 TABLET,CHEWABLE ORAL ONCE
Status: DISCONTINUED | OUTPATIENT
Start: 2018-07-27 | End: 2018-07-27 | Stop reason: HOSPADM

## 2018-07-27 RX ORDER — FUROSEMIDE 40 MG/1
40 TABLET ORAL 2 TIMES DAILY
Qty: 60 TABLET | Refills: 1 | Status: SHIPPED | OUTPATIENT
Start: 2018-07-27

## 2018-07-27 RX ORDER — FUROSEMIDE 40 MG/1
40 TABLET ORAL
Status: DISCONTINUED | OUTPATIENT
Start: 2018-07-27 | End: 2018-07-27 | Stop reason: HOSPADM

## 2018-07-27 RX ORDER — LISINOPRIL 10 MG/1
10 TABLET ORAL
Qty: 30 TABLET | Refills: 1 | Status: SHIPPED | OUTPATIENT
Start: 2018-07-28

## 2018-07-27 RX ORDER — HYDROXYZINE PAMOATE 50 MG/1
50 CAPSULE ORAL NIGHTLY PRN
Qty: 15 CAPSULE | Refills: 1 | Status: SHIPPED | OUTPATIENT
Start: 2018-07-27

## 2018-07-27 RX ORDER — GLYBURIDE 5 MG/1
5 TABLET ORAL 2 TIMES DAILY WITH MEALS
Qty: 60 TABLET | Refills: 1 | Status: SHIPPED | OUTPATIENT
Start: 2018-07-27

## 2018-07-27 RX ORDER — METOPROLOL SUCCINATE 25 MG/1
12.5 TABLET, EXTENDED RELEASE ORAL DAILY
Qty: 15 TABLET | Refills: 1 | Status: SHIPPED | OUTPATIENT
Start: 2018-07-28

## 2018-07-27 RX ADMIN — INSULIN LISPRO 3 UNITS: 100 INJECTION, SOLUTION INTRAVENOUS; SUBCUTANEOUS at 08:38

## 2018-07-27 RX ADMIN — GLIPIZIDE 2.5 MG: 5 TABLET ORAL at 08:38

## 2018-07-27 RX ADMIN — FUROSEMIDE 40 MG: 10 INJECTION, SOLUTION INTRAMUSCULAR; INTRAVENOUS at 08:38

## 2018-07-27 RX ADMIN — CLOPIDOGREL 75 MG: 75 TABLET, FILM COATED ORAL at 08:39

## 2018-07-27 RX ADMIN — BISACODYL 10 MG: 10 SUPPOSITORY RECTAL at 11:49

## 2018-07-27 RX ADMIN — ASPIRIN 81 MG: 81 TABLET ORAL at 08:39

## 2018-07-27 RX ADMIN — METOPROLOL SUCCINATE 12.5 MG: 25 TABLET, FILM COATED, EXTENDED RELEASE ORAL at 08:39

## 2018-07-27 RX ADMIN — INSULIN LISPRO 2 UNITS: 100 INJECTION, SOLUTION INTRAVENOUS; SUBCUTANEOUS at 11:43

## 2018-07-27 RX ADMIN — POLYETHYLENE GLYCOL (3350) 17 G: 17 POWDER, FOR SOLUTION ORAL at 08:38

## 2018-07-27 RX ADMIN — CHLORHEXIDINE GLUCONATE 15 ML: 1.2 RINSE ORAL at 08:38

## 2018-07-27 RX ADMIN — LISINOPRIL 10 MG: 10 TABLET ORAL at 08:39
